# Patient Record
Sex: MALE | Race: WHITE | Employment: OTHER | URBAN - METROPOLITAN AREA
[De-identification: names, ages, dates, MRNs, and addresses within clinical notes are randomized per-mention and may not be internally consistent; named-entity substitution may affect disease eponyms.]

---

## 2019-01-01 ENCOUNTER — APPOINTMENT (OUTPATIENT)
Dept: GENERAL RADIOLOGY | Age: 84
DRG: 871 | End: 2019-01-01
Attending: INTERNAL MEDICINE
Payer: OTHER GOVERNMENT

## 2019-01-01 ENCOUNTER — APPOINTMENT (OUTPATIENT)
Dept: GENERAL RADIOLOGY | Age: 84
DRG: 871 | End: 2019-01-01
Attending: EMERGENCY MEDICINE
Payer: OTHER GOVERNMENT

## 2019-01-01 ENCOUNTER — APPOINTMENT (OUTPATIENT)
Dept: NON INVASIVE DIAGNOSTICS | Age: 84
DRG: 871 | End: 2019-01-01
Attending: INTERNAL MEDICINE
Payer: OTHER GOVERNMENT

## 2019-01-01 ENCOUNTER — HOSPITAL ENCOUNTER (INPATIENT)
Age: 84
LOS: 2 days | DRG: 871 | End: 2019-10-06
Attending: EMERGENCY MEDICINE | Admitting: INTERNAL MEDICINE
Payer: OTHER GOVERNMENT

## 2019-01-01 ENCOUNTER — APPOINTMENT (OUTPATIENT)
Dept: GENERAL RADIOLOGY | Age: 84
DRG: 871 | End: 2019-01-01
Attending: ANESTHESIOLOGY
Payer: OTHER GOVERNMENT

## 2019-01-01 VITALS
HEIGHT: 67 IN | SYSTOLIC BLOOD PRESSURE: 101 MMHG | BODY MASS INDEX: 24.84 KG/M2 | HEART RATE: 8 BPM | OXYGEN SATURATION: 94 % | WEIGHT: 158.29 LBS | DIASTOLIC BLOOD PRESSURE: 14 MMHG | TEMPERATURE: 98.5 F

## 2019-01-01 DIAGNOSIS — J96.21 ACUTE ON CHRONIC RESPIRATORY FAILURE WITH HYPOXIA AND HYPERCAPNIA (HCC): Primary | ICD-10-CM

## 2019-01-01 DIAGNOSIS — R05.9 COUGH: ICD-10-CM

## 2019-01-01 DIAGNOSIS — J96.22 ACUTE ON CHRONIC RESPIRATORY FAILURE WITH HYPOXIA AND HYPERCAPNIA (HCC): Primary | ICD-10-CM

## 2019-01-01 LAB
ALBUMIN SERPL-MCNC: 3.2 G/DL (ref 3.5–5)
ALBUMIN SERPL-MCNC: 3.4 G/DL (ref 3.5–5)
ALBUMIN/GLOB SERPL: 0.7 {RATIO} (ref 1.1–2.2)
ALBUMIN/GLOB SERPL: 0.9 {RATIO} (ref 1.1–2.2)
ALP SERPL-CCNC: 111 U/L (ref 45–117)
ALP SERPL-CCNC: 112 U/L (ref 45–117)
ALT SERPL-CCNC: 19 U/L (ref 12–78)
ALT SERPL-CCNC: 353 U/L (ref 12–78)
ANION GAP SERPL CALC-SCNC: 10 MMOL/L (ref 5–15)
ANION GAP SERPL CALC-SCNC: 10 MMOL/L (ref 5–15)
ANION GAP SERPL CALC-SCNC: 7 MMOL/L (ref 5–15)
ARTERIAL PATENCY WRIST A: YES
AST SERPL-CCNC: 24 U/L (ref 15–37)
AST SERPL-CCNC: 544 U/L (ref 15–37)
ATRIAL RATE: 123 BPM
ATRIAL RATE: 81 BPM
BASE DEFICIT BLD-SCNC: 1 MMOL/L
BASE DEFICIT BLD-SCNC: 2 MMOL/L
BASE DEFICIT BLD-SCNC: 3 MMOL/L
BASOPHILS # BLD: 0 K/UL (ref 0–0.1)
BASOPHILS # BLD: 0 K/UL (ref 0–0.1)
BASOPHILS NFR BLD: 0 % (ref 0–1)
BASOPHILS NFR BLD: 0 % (ref 0–1)
BDY SITE: ABNORMAL
BILIRUB SERPL-MCNC: 0.4 MG/DL (ref 0.2–1)
BILIRUB SERPL-MCNC: 1.1 MG/DL (ref 0.2–1)
BNP SERPL-MCNC: ABNORMAL PG/ML
BUN SERPL-MCNC: 26 MG/DL (ref 6–20)
BUN SERPL-MCNC: 28 MG/DL (ref 6–20)
BUN SERPL-MCNC: 32 MG/DL (ref 6–20)
BUN/CREAT SERPL: 7 (ref 12–20)
BUN/CREAT SERPL: 7 (ref 12–20)
BUN/CREAT SERPL: 9 (ref 12–20)
CALCIUM SERPL-MCNC: 8.3 MG/DL (ref 8.5–10.1)
CALCIUM SERPL-MCNC: 8.5 MG/DL (ref 8.5–10.1)
CALCIUM SERPL-MCNC: 8.8 MG/DL (ref 8.5–10.1)
CALCULATED P AXIS, ECG09: -6 DEGREES
CALCULATED P AXIS, ECG09: 54 DEGREES
CALCULATED R AXIS, ECG10: -52 DEGREES
CALCULATED R AXIS, ECG10: -67 DEGREES
CALCULATED T AXIS, ECG11: -113 DEGREES
CALCULATED T AXIS, ECG11: 83 DEGREES
CHLORIDE SERPL-SCNC: 102 MMOL/L (ref 97–108)
CHLORIDE SERPL-SCNC: 103 MMOL/L (ref 97–108)
CHLORIDE SERPL-SCNC: 96 MMOL/L (ref 97–108)
CK MB CFR SERPL CALC: 10 % (ref 0–2.5)
CK MB CFR SERPL CALC: 13.7 % (ref 0–2.5)
CK MB SERPL-MCNC: 19.4 NG/ML (ref 5–25)
CK MB SERPL-MCNC: 22.7 NG/ML (ref 5–25)
CK SERPL-CCNC: 142 U/L (ref 39–308)
CK SERPL-CCNC: 227 U/L (ref 39–308)
CO2 SERPL-SCNC: 24 MMOL/L (ref 21–32)
CO2 SERPL-SCNC: 26 MMOL/L (ref 21–32)
CO2 SERPL-SCNC: 26 MMOL/L (ref 21–32)
CREAT SERPL-MCNC: 3.6 MG/DL (ref 0.7–1.3)
CREAT SERPL-MCNC: 3.96 MG/DL (ref 0.7–1.3)
CREAT SERPL-MCNC: 4.07 MG/DL (ref 0.7–1.3)
DIAGNOSIS, 93000: NORMAL
DIAGNOSIS, 93000: NORMAL
DIFFERENTIAL METHOD BLD: ABNORMAL
DIFFERENTIAL METHOD BLD: ABNORMAL
ECHO AO ROOT DIAM: 3.35 CM
ECHO AV AREA PEAK VELOCITY: 1.4 CM2
ECHO AV AREA VTI: 1.3 CM2
ECHO AV AREA/BSA PEAK VELOCITY: 0.7 CM2/M2
ECHO AV AREA/BSA VTI: 0.7 CM2/M2
ECHO AV MEAN GRADIENT: 13.7 MMHG
ECHO AV PEAK GRADIENT: 18.1 MMHG
ECHO AV PEAK VELOCITY: 212.97 CM/S
ECHO AV VTI: 50.09 CM
ECHO LA MAJOR AXIS: 4.2 CM
ECHO LA TO AORTIC ROOT RATIO: 1.25
ECHO LV E' LATERAL VELOCITY: 7.93 CM/S
ECHO LV E' SEPTAL VELOCITY: 4.01 CM/S
ECHO LV INTERNAL DIMENSION DIASTOLIC: 5.01 CM (ref 4.2–5.9)
ECHO LV INTERNAL DIMENSION SYSTOLIC: 4.32 CM
ECHO LV IVSD: 1.09 CM (ref 0.6–1)
ECHO LV IVSS: 1.39 CM
ECHO LV MASS 2D: 240.5 G (ref 88–224)
ECHO LV MASS INDEX 2D: 126.8 G/M2 (ref 49–115)
ECHO LV POSTERIOR WALL DIASTOLIC: 1.08 CM (ref 0.6–1)
ECHO LV POSTERIOR WALL SYSTOLIC: 0.9 CM
ECHO LVOT CARDIAC OUTPUT: 5.6 L/MIN
ECHO LVOT DIAM: 2.08 CM
ECHO LVOT PEAK GRADIENT: 3 MMHG
ECHO LVOT PEAK VELOCITY: 87.07 CM/S
ECHO LVOT SV: 64.9 ML
ECHO LVOT VTI: 19.04 CM
ECHO MV AREA PHT: 5.6 CM2
ECHO MV E DECELERATION TIME (DT): 120.7 MS
ECHO MV E VELOCITY: 100.76 CM/S
ECHO MV E/E' LATERAL: 12.71
ECHO MV E/E' RATIO (AVERAGED): 18.92
ECHO MV E/E' SEPTAL: 25.13
ECHO MV PRESSURE HALF TIME (PHT): 39.2 MS
ECHO PV MAX VELOCITY: 94.31 CM/S
ECHO PV PEAK GRADIENT: 3.6 MMHG
EOSINOPHIL # BLD: 0 K/UL (ref 0–0.4)
EOSINOPHIL # BLD: 0 K/UL (ref 0–0.4)
EOSINOPHIL NFR BLD: 0 % (ref 0–7)
EOSINOPHIL NFR BLD: 0 % (ref 0–7)
ERYTHROCYTE [DISTWIDTH] IN BLOOD BY AUTOMATED COUNT: 16.4 % (ref 11.5–14.5)
ERYTHROCYTE [DISTWIDTH] IN BLOOD BY AUTOMATED COUNT: 16.5 % (ref 11.5–14.5)
EST. AVERAGE GLUCOSE BLD GHB EST-MCNC: 105 MG/DL
GAS FLOW.O2 O2 DELIVERY SYS: ABNORMAL L/MIN
GAS FLOW.O2 SETTING OXYMISER: 16 BPM
GLOBULIN SER CALC-MCNC: 3.8 G/DL (ref 2–4)
GLOBULIN SER CALC-MCNC: 4.3 G/DL (ref 2–4)
GLUCOSE BLD STRIP.AUTO-MCNC: 117 MG/DL (ref 65–100)
GLUCOSE BLD STRIP.AUTO-MCNC: 129 MG/DL (ref 65–100)
GLUCOSE BLD STRIP.AUTO-MCNC: 130 MG/DL (ref 65–100)
GLUCOSE BLD STRIP.AUTO-MCNC: 156 MG/DL (ref 65–100)
GLUCOSE BLD STRIP.AUTO-MCNC: 158 MG/DL (ref 65–100)
GLUCOSE BLD STRIP.AUTO-MCNC: 162 MG/DL (ref 65–100)
GLUCOSE BLD STRIP.AUTO-MCNC: 198 MG/DL (ref 65–100)
GLUCOSE SERPL-MCNC: 146 MG/DL (ref 65–100)
GLUCOSE SERPL-MCNC: 308 MG/DL (ref 65–100)
GLUCOSE SERPL-MCNC: 538 MG/DL (ref 65–100)
HBA1C MFR BLD: 5.3 % (ref 4.2–6.3)
HCO3 BLD-SCNC: 21.7 MMOL/L (ref 22–26)
HCO3 BLD-SCNC: 25.4 MMOL/L (ref 22–26)
HCO3 BLD-SCNC: 26 MMOL/L (ref 22–26)
HCT VFR BLD AUTO: 34.2 % (ref 36.6–50.3)
HCT VFR BLD AUTO: 38.8 % (ref 36.6–50.3)
HGB BLD-MCNC: 10.9 G/DL (ref 12.1–17)
HGB BLD-MCNC: 12.1 G/DL (ref 12.1–17)
IMM GRANULOCYTES # BLD AUTO: 0 K/UL (ref 0–0.04)
IMM GRANULOCYTES # BLD AUTO: 0.2 K/UL (ref 0–0.04)
IMM GRANULOCYTES NFR BLD AUTO: 0 % (ref 0–0.5)
IMM GRANULOCYTES NFR BLD AUTO: 1 % (ref 0–0.5)
LACTATE BLD-SCNC: 3.78 MMOL/L (ref 0.4–2)
LACTATE SERPL-SCNC: 10.8 MMOL/L (ref 0.4–2)
LACTATE SERPL-SCNC: 2.4 MMOL/L (ref 0.4–2)
LACTATE SERPL-SCNC: 3.4 MMOL/L (ref 0.4–2)
LACTATE SERPL-SCNC: 4.7 MMOL/L (ref 0.4–2)
LACTATE SERPL-SCNC: 6.2 MMOL/L (ref 0.4–2)
LACTATE SERPL-SCNC: 7.7 MMOL/L (ref 0.4–2)
LYMPHOCYTES # BLD: 0.7 K/UL (ref 0.8–3.5)
LYMPHOCYTES # BLD: 5.4 K/UL (ref 0.8–3.5)
LYMPHOCYTES NFR BLD: 27 % (ref 12–49)
LYMPHOCYTES NFR BLD: 4 % (ref 12–49)
MCH RBC QN AUTO: 30.9 PG (ref 26–34)
MCH RBC QN AUTO: 31 PG (ref 26–34)
MCHC RBC AUTO-ENTMCNC: 31.2 G/DL (ref 30–36.5)
MCHC RBC AUTO-ENTMCNC: 31.9 G/DL (ref 30–36.5)
MCV RBC AUTO: 97.2 FL (ref 80–99)
MCV RBC AUTO: 99.2 FL (ref 80–99)
MONOCYTES # BLD: 1.4 K/UL (ref 0–1)
MONOCYTES # BLD: 1.5 K/UL (ref 0–1)
MONOCYTES NFR BLD: 7 % (ref 5–13)
MONOCYTES NFR BLD: 8 % (ref 5–13)
NEUTS SEG # BLD: 13.2 K/UL (ref 1.8–8)
NEUTS SEG # BLD: 16.2 K/UL (ref 1.8–8)
NEUTS SEG NFR BLD: 66 % (ref 32–75)
NEUTS SEG NFR BLD: 87 % (ref 32–75)
NRBC # BLD: 0.02 K/UL (ref 0–0.01)
NRBC # BLD: 0.07 K/UL (ref 0–0.01)
NRBC BLD-RTO: 0.1 PER 100 WBC
NRBC BLD-RTO: 0.4 PER 100 WBC
O2/TOTAL GAS SETTING VFR VENT: 30 %
O2/TOTAL GAS SETTING VFR VENT: 50 %
O2/TOTAL GAS SETTING VFR VENT: 50 %
P-R INTERVAL, ECG05: 168 MS
P-R INTERVAL, ECG05: 232 MS
PCO2 BLD: 33.6 MMHG (ref 35–45)
PCO2 BLD: 48.3 MMHG (ref 35–45)
PCO2 BLD: 68.5 MMHG (ref 35–45)
PEEP RESPIRATORY: 6 CMH2O
PH BLD: 7.19 [PH] (ref 7.35–7.45)
PH BLD: 7.33 [PH] (ref 7.35–7.45)
PH BLD: 7.42 [PH] (ref 7.35–7.45)
PIP ISTAT,IPIP: 12
PIP ISTAT,IPIP: 18
PLATELET # BLD AUTO: 145 K/UL (ref 150–400)
PLATELET # BLD AUTO: 216 K/UL (ref 150–400)
PMV BLD AUTO: 9.9 FL (ref 8.9–12.9)
PMV BLD AUTO: 9.9 FL (ref 8.9–12.9)
PO2 BLD: 100 MMHG (ref 80–100)
PO2 BLD: 185 MMHG (ref 80–100)
PO2 BLD: 194 MMHG (ref 80–100)
POTASSIUM SERPL-SCNC: 4.2 MMOL/L (ref 3.5–5.1)
POTASSIUM SERPL-SCNC: 4.6 MMOL/L (ref 3.5–5.1)
POTASSIUM SERPL-SCNC: 5 MMOL/L (ref 3.5–5.1)
PROCALCITONIN SERPL-MCNC: 1.3 NG/ML
PROT SERPL-MCNC: 7.2 G/DL (ref 6.4–8.2)
PROT SERPL-MCNC: 7.5 G/DL (ref 6.4–8.2)
Q-T INTERVAL, ECG07: 338 MS
Q-T INTERVAL, ECG07: 448 MS
QRS DURATION, ECG06: 132 MS
QRS DURATION, ECG06: 142 MS
QTC CALCULATION (BEZET), ECG08: 483 MS
QTC CALCULATION (BEZET), ECG08: 520 MS
RBC # BLD AUTO: 3.52 M/UL (ref 4.1–5.7)
RBC # BLD AUTO: 3.91 M/UL (ref 4.1–5.7)
RBC MORPH BLD: ABNORMAL
SAO2 % BLD: 100 % (ref 92–97)
SAO2 % BLD: 98 % (ref 92–97)
SAO2 % BLD: 99 % (ref 92–97)
SERVICE CMNT-IMP: ABNORMAL
SODIUM SERPL-SCNC: 129 MMOL/L (ref 136–145)
SODIUM SERPL-SCNC: 137 MMOL/L (ref 136–145)
SODIUM SERPL-SCNC: 138 MMOL/L (ref 136–145)
SPECIMEN TYPE: ABNORMAL
TOTAL RESP. RATE, ITRR: 24
TOTAL RESP. RATE, ITRR: 29
TOTAL RESP. RATE, ITRR: 34
TROPONIN I SERPL-MCNC: 0.17 NG/ML
TROPONIN I SERPL-MCNC: 2.38 NG/ML
TROPONIN I SERPL-MCNC: 4.39 NG/ML
TROPONIN I SERPL-MCNC: 4.45 NG/ML
VENTILATION MODE VENT: ABNORMAL
VENTRICULAR RATE, ECG03: 123 BPM
VENTRICULAR RATE, ECG03: 81 BPM
VT SETTING VENT: 450 ML
WBC # BLD AUTO: 18.5 K/UL (ref 4.1–11.1)
WBC # BLD AUTO: 20 K/UL (ref 4.1–11.1)

## 2019-01-01 PROCEDURE — 74011250636 HC RX REV CODE- 250/636: Performed by: EMERGENCY MEDICINE

## 2019-01-01 PROCEDURE — 96368 THER/DIAG CONCURRENT INF: CPT

## 2019-01-01 PROCEDURE — 74011000250 HC RX REV CODE- 250: Performed by: INTERNAL MEDICINE

## 2019-01-01 PROCEDURE — 31500 INSERT EMERGENCY AIRWAY: CPT

## 2019-01-01 PROCEDURE — 94003 VENT MGMT INPAT SUBQ DAY: CPT

## 2019-01-01 PROCEDURE — 36600 WITHDRAWAL OF ARTERIAL BLOOD: CPT

## 2019-01-01 PROCEDURE — 74011250637 HC RX REV CODE- 250/637: Performed by: INTERNAL MEDICINE

## 2019-01-01 PROCEDURE — 82550 ASSAY OF CK (CPK): CPT

## 2019-01-01 PROCEDURE — 96367 TX/PROPH/DG ADDL SEQ IV INF: CPT

## 2019-01-01 PROCEDURE — 80053 COMPREHEN METABOLIC PANEL: CPT

## 2019-01-01 PROCEDURE — 96365 THER/PROPH/DIAG IV INF INIT: CPT

## 2019-01-01 PROCEDURE — 74011250636 HC RX REV CODE- 250/636: Performed by: INTERNAL MEDICINE

## 2019-01-01 PROCEDURE — 77030008683 HC TU ET CUF COVD -A

## 2019-01-01 PROCEDURE — 82803 BLOOD GASES ANY COMBINATION: CPT

## 2019-01-01 PROCEDURE — 82962 GLUCOSE BLOOD TEST: CPT

## 2019-01-01 PROCEDURE — 94640 AIRWAY INHALATION TREATMENT: CPT

## 2019-01-01 PROCEDURE — 5A1945Z RESPIRATORY VENTILATION, 24-96 CONSECUTIVE HOURS: ICD-10-PCS | Performed by: EMERGENCY MEDICINE

## 2019-01-01 PROCEDURE — 83880 ASSAY OF NATRIURETIC PEPTIDE: CPT

## 2019-01-01 PROCEDURE — 74011250636 HC RX REV CODE- 250/636: Performed by: ANESTHESIOLOGY

## 2019-01-01 PROCEDURE — 84145 PROCALCITONIN (PCT): CPT

## 2019-01-01 PROCEDURE — 71045 X-RAY EXAM CHEST 1 VIEW: CPT

## 2019-01-01 PROCEDURE — 5A09357 ASSISTANCE WITH RESPIRATORY VENTILATION, LESS THAN 24 CONSECUTIVE HOURS, CONTINUOUS POSITIVE AIRWAY PRESSURE: ICD-10-PCS | Performed by: ANESTHESIOLOGY

## 2019-01-01 PROCEDURE — 65610000006 HC RM INTENSIVE CARE

## 2019-01-01 PROCEDURE — 74011000258 HC RX REV CODE- 258: Performed by: EMERGENCY MEDICINE

## 2019-01-01 PROCEDURE — 83605 ASSAY OF LACTIC ACID: CPT

## 2019-01-01 PROCEDURE — 94002 VENT MGMT INPAT INIT DAY: CPT

## 2019-01-01 PROCEDURE — 77030012879 HC MSK CPAP FLL FAC PHIL -B

## 2019-01-01 PROCEDURE — 5A1D70Z PERFORMANCE OF URINARY FILTRATION, INTERMITTENT, LESS THAN 6 HOURS PER DAY: ICD-10-PCS | Performed by: INTERNAL MEDICINE

## 2019-01-01 PROCEDURE — 90935 HEMODIALYSIS ONE EVALUATION: CPT

## 2019-01-01 PROCEDURE — 05HN33Z INSERTION OF INFUSION DEVICE INTO LEFT INTERNAL JUGULAR VEIN, PERCUTANEOUS APPROACH: ICD-10-PCS | Performed by: ANESTHESIOLOGY

## 2019-01-01 PROCEDURE — P9047 ALBUMIN (HUMAN), 25%, 50ML: HCPCS | Performed by: INTERNAL MEDICINE

## 2019-01-01 PROCEDURE — 36415 COLL VENOUS BLD VENIPUNCTURE: CPT

## 2019-01-01 PROCEDURE — 74011000250 HC RX REV CODE- 250: Performed by: EMERGENCY MEDICINE

## 2019-01-01 PROCEDURE — 96366 THER/PROPH/DIAG IV INF ADDON: CPT

## 2019-01-01 PROCEDURE — 93005 ELECTROCARDIOGRAM TRACING: CPT

## 2019-01-01 PROCEDURE — 85025 COMPLETE CBC W/AUTO DIFF WBC: CPT

## 2019-01-01 PROCEDURE — 77030013140 HC MSK NEB VYRM -A

## 2019-01-01 PROCEDURE — 77030037878 HC DRSG MEPILEX >48IN BORD MOLN -B

## 2019-01-01 PROCEDURE — 83036 HEMOGLOBIN GLYCOSYLATED A1C: CPT

## 2019-01-01 PROCEDURE — 96375 TX/PRO/DX INJ NEW DRUG ADDON: CPT

## 2019-01-01 PROCEDURE — 77010033678 HC OXYGEN DAILY

## 2019-01-01 PROCEDURE — 77030040361 HC SLV COMPR DVT MDII -B

## 2019-01-01 PROCEDURE — 99285 EMERGENCY DEPT VISIT HI MDM: CPT

## 2019-01-01 PROCEDURE — 0BH17EZ INSERTION OF ENDOTRACHEAL AIRWAY INTO TRACHEA, VIA NATURAL OR ARTIFICIAL OPENING: ICD-10-PCS | Performed by: ANESTHESIOLOGY

## 2019-01-01 PROCEDURE — 93306 TTE W/DOPPLER COMPLETE: CPT

## 2019-01-01 PROCEDURE — B544ZZA ULTRASONOGRAPHY OF LEFT JUGULAR VEINS, GUIDANCE: ICD-10-PCS | Performed by: ANESTHESIOLOGY

## 2019-01-01 PROCEDURE — 94660 CPAP INITIATION&MGMT: CPT

## 2019-01-01 PROCEDURE — 74011250636 HC RX REV CODE- 250/636

## 2019-01-01 PROCEDURE — 84484 ASSAY OF TROPONIN QUANT: CPT

## 2019-01-01 PROCEDURE — 74011250636 HC RX REV CODE- 250/636: Performed by: FAMILY MEDICINE

## 2019-01-01 RX ORDER — MAGNESIUM SULFATE HEPTAHYDRATE 40 MG/ML
2 INJECTION, SOLUTION INTRAVENOUS ONCE
Status: COMPLETED | OUTPATIENT
Start: 2019-01-01 | End: 2019-01-01

## 2019-01-01 RX ORDER — PROPOFOL 10 MG/ML
150 INJECTION, EMULSION INTRAVENOUS
Status: COMPLETED | OUTPATIENT
Start: 2019-01-01 | End: 2019-01-01

## 2019-01-01 RX ORDER — ALBUTEROL SULFATE 0.83 MG/ML
2.5 SOLUTION RESPIRATORY (INHALATION)
Status: DISCONTINUED | OUTPATIENT
Start: 2019-01-01 | End: 2019-01-01 | Stop reason: HOSPADM

## 2019-01-01 RX ORDER — LEVOFLOXACIN 5 MG/ML
750 INJECTION, SOLUTION INTRAVENOUS EVERY 24 HOURS
Status: DISCONTINUED | OUTPATIENT
Start: 2019-01-01 | End: 2019-01-01 | Stop reason: DRUGHIGH

## 2019-01-01 RX ORDER — BUDESONIDE 0.5 MG/2ML
500 INHALANT ORAL
Status: DISCONTINUED | OUTPATIENT
Start: 2019-01-01 | End: 2019-01-01 | Stop reason: HOSPADM

## 2019-01-01 RX ORDER — IPRATROPIUM BROMIDE AND ALBUTEROL SULFATE 2.5; .5 MG/3ML; MG/3ML
3 SOLUTION RESPIRATORY (INHALATION)
Status: DISCONTINUED | OUTPATIENT
Start: 2019-01-01 | End: 2019-01-01 | Stop reason: HOSPADM

## 2019-01-01 RX ORDER — BISACODYL 5 MG
5 TABLET, DELAYED RELEASE (ENTERIC COATED) ORAL DAILY PRN
Status: DISCONTINUED | OUTPATIENT
Start: 2019-01-01 | End: 2019-01-01 | Stop reason: HOSPADM

## 2019-01-01 RX ORDER — MORPHINE SULFATE 2 MG/ML
2 INJECTION, SOLUTION INTRAMUSCULAR; INTRAVENOUS
Status: DISCONTINUED | OUTPATIENT
Start: 2019-01-01 | End: 2019-01-01 | Stop reason: HOSPADM

## 2019-01-01 RX ORDER — SUCCINYLCHOLINE CHLORIDE 20 MG/ML INJECTION SOLUTION
SOLUTION
Status: DISCONTINUED
Start: 2019-01-01 | End: 2019-01-01 | Stop reason: WASHOUT

## 2019-01-01 RX ORDER — PHENYLEPHRINE HCL IN 0.9% NACL 30MG/250ML
10-300 PLASTIC BAG, INJECTION (ML) INTRAVENOUS
Status: DISCONTINUED | OUTPATIENT
Start: 2019-01-01 | End: 2019-01-01

## 2019-01-01 RX ORDER — PHENYLEPHRINE HCL IN 0.9% NACL 30MG/250ML
10-300 PLASTIC BAG, INJECTION (ML) INTRAVENOUS
Status: DISCONTINUED | OUTPATIENT
Start: 2019-01-01 | End: 2019-01-01 | Stop reason: HOSPADM

## 2019-01-01 RX ORDER — HEPARIN SODIUM 5000 [USP'U]/ML
5000 INJECTION, SOLUTION INTRAVENOUS; SUBCUTANEOUS EVERY 12 HOURS
Status: DISCONTINUED | OUTPATIENT
Start: 2019-01-01 | End: 2019-01-01 | Stop reason: DRUGHIGH

## 2019-01-01 RX ORDER — LEVOFLOXACIN 5 MG/ML
500 INJECTION, SOLUTION INTRAVENOUS
Status: DISCONTINUED | OUTPATIENT
Start: 2019-01-01 | End: 2019-01-01 | Stop reason: HOSPADM

## 2019-01-01 RX ORDER — ONDANSETRON 2 MG/ML
4 INJECTION INTRAMUSCULAR; INTRAVENOUS
Status: DISCONTINUED | OUTPATIENT
Start: 2019-01-01 | End: 2019-01-01 | Stop reason: HOSPADM

## 2019-01-01 RX ORDER — ALBUTEROL SULFATE 0.83 MG/ML
2.5 SOLUTION RESPIRATORY (INHALATION)
COMMUNITY

## 2019-01-01 RX ORDER — PROPOFOL 10 MG/ML
INJECTION, EMULSION INTRAVENOUS
Status: COMPLETED
Start: 2019-01-01 | End: 2019-01-01

## 2019-01-01 RX ORDER — SODIUM CHLORIDE 0.9 % (FLUSH) 0.9 %
5-40 SYRINGE (ML) INJECTION AS NEEDED
Status: DISCONTINUED | OUTPATIENT
Start: 2019-01-01 | End: 2019-01-01 | Stop reason: HOSPADM

## 2019-01-01 RX ORDER — CHLORHEXIDINE GLUCONATE 0.12 MG/ML
15 RINSE ORAL EVERY 12 HOURS
Status: DISCONTINUED | OUTPATIENT
Start: 2019-01-01 | End: 2019-01-01 | Stop reason: HOSPADM

## 2019-01-01 RX ORDER — LORAZEPAM 2 MG/ML
1 INJECTION INTRAMUSCULAR
Status: DISCONTINUED | OUTPATIENT
Start: 2019-01-01 | End: 2019-01-01 | Stop reason: HOSPADM

## 2019-01-01 RX ORDER — LEVOFLOXACIN 5 MG/ML
750 INJECTION, SOLUTION INTRAVENOUS
Status: COMPLETED | OUTPATIENT
Start: 2019-01-01 | End: 2019-01-01

## 2019-01-01 RX ORDER — IPRATROPIUM BROMIDE 0.5 MG/2.5ML
0.5 SOLUTION RESPIRATORY (INHALATION) ONCE
Status: COMPLETED | OUTPATIENT
Start: 2019-01-01 | End: 2019-01-01

## 2019-01-01 RX ORDER — SODIUM CHLORIDE 0.9 % (FLUSH) 0.9 %
5-40 SYRINGE (ML) INJECTION EVERY 8 HOURS
Status: DISCONTINUED | OUTPATIENT
Start: 2019-01-01 | End: 2019-01-01 | Stop reason: HOSPADM

## 2019-01-01 RX ORDER — IPRATROPIUM BROMIDE AND ALBUTEROL SULFATE 2.5; .5 MG/3ML; MG/3ML
3 SOLUTION RESPIRATORY (INHALATION)
Status: COMPLETED | OUTPATIENT
Start: 2019-01-01 | End: 2019-01-01

## 2019-01-01 RX ORDER — CHLORHEXIDINE GLUCONATE 0.12 MG/ML
15 RINSE ORAL EVERY 12 HOURS
Status: DISCONTINUED | OUTPATIENT
Start: 2019-01-01 | End: 2019-01-01

## 2019-01-01 RX ORDER — SCOLOPAMINE TRANSDERMAL SYSTEM 1 MG/1
1 PATCH, EXTENDED RELEASE TRANSDERMAL
Status: DISCONTINUED | OUTPATIENT
Start: 2019-01-01 | End: 2019-01-01 | Stop reason: HOSPADM

## 2019-01-01 RX ORDER — ALBUMIN HUMAN 250 G/1000ML
12.5 SOLUTION INTRAVENOUS
Status: DISCONTINUED | OUTPATIENT
Start: 2019-01-01 | End: 2019-01-01 | Stop reason: HOSPADM

## 2019-01-01 RX ORDER — IPRATROPIUM BROMIDE 0.5 MG/2.5ML
0.5 SOLUTION RESPIRATORY (INHALATION)
COMMUNITY

## 2019-01-01 RX ORDER — ACETAMINOPHEN 325 MG/1
650 TABLET ORAL
Status: DISCONTINUED | OUTPATIENT
Start: 2019-01-01 | End: 2019-01-01 | Stop reason: HOSPADM

## 2019-01-01 RX ORDER — LORAZEPAM 2 MG/ML
INJECTION INTRAMUSCULAR
Status: COMPLETED
Start: 2019-01-01 | End: 2019-01-01

## 2019-01-01 RX ORDER — ALBUTEROL SULFATE 2.5 MG/.5ML
10 SOLUTION RESPIRATORY (INHALATION)
Status: COMPLETED | OUTPATIENT
Start: 2019-01-01 | End: 2019-01-01

## 2019-01-01 RX ORDER — FAMOTIDINE 10 MG/ML
20 INJECTION INTRAVENOUS EVERY 12 HOURS
Status: DISCONTINUED | OUTPATIENT
Start: 2019-01-01 | End: 2019-01-01 | Stop reason: DRUGHIGH

## 2019-01-01 RX ORDER — INSULIN LISPRO 100 [IU]/ML
INJECTION, SOLUTION INTRAVENOUS; SUBCUTANEOUS
Status: DISCONTINUED | OUTPATIENT
Start: 2019-01-01 | End: 2019-01-01 | Stop reason: HOSPADM

## 2019-01-01 RX ORDER — FINASTERIDE 5 MG/1
5 TABLET, FILM COATED ORAL DAILY
COMMUNITY

## 2019-01-01 RX ORDER — LORAZEPAM 2 MG/ML
0.5 INJECTION INTRAMUSCULAR ONCE
Status: COMPLETED | OUTPATIENT
Start: 2019-01-01 | End: 2019-01-01

## 2019-01-01 RX ORDER — PROPOFOL 10 MG/ML
0-50 VIAL (ML) INTRAVENOUS
Status: DISCONTINUED | OUTPATIENT
Start: 2019-01-01 | End: 2019-01-01 | Stop reason: HOSPADM

## 2019-01-01 RX ORDER — VANCOMYCIN/0.9 % SOD CHLORIDE 1.5G/250ML
1500 PLASTIC BAG, INJECTION (ML) INTRAVENOUS
Status: COMPLETED | OUTPATIENT
Start: 2019-01-01 | End: 2019-01-01

## 2019-01-01 RX ORDER — DEXTROSE 50 % IN WATER (D50W) INTRAVENOUS SYRINGE
12.5-25 AS NEEDED
Status: DISCONTINUED | OUTPATIENT
Start: 2019-01-01 | End: 2019-01-01 | Stop reason: HOSPADM

## 2019-01-01 RX ORDER — MAGNESIUM SULFATE 100 %
4 CRYSTALS MISCELLANEOUS AS NEEDED
Status: DISCONTINUED | OUTPATIENT
Start: 2019-01-01 | End: 2019-01-01 | Stop reason: HOSPADM

## 2019-01-01 RX ORDER — HEPARIN SODIUM 5000 [USP'U]/ML
5000 INJECTION, SOLUTION INTRAVENOUS; SUBCUTANEOUS EVERY 8 HOURS
Status: DISCONTINUED | OUTPATIENT
Start: 2019-01-01 | End: 2019-01-01 | Stop reason: HOSPADM

## 2019-01-01 RX ORDER — ASPIRIN 300 MG/1
300 SUPPOSITORY RECTAL DAILY
Status: DISCONTINUED | OUTPATIENT
Start: 2019-01-01 | End: 2019-01-01 | Stop reason: HOSPADM

## 2019-01-01 RX ORDER — ATORVASTATIN CALCIUM 80 MG/1
40 TABLET, FILM COATED ORAL DAILY
COMMUNITY

## 2019-01-01 RX ADMIN — Medication 1 AMPULE: at 22:34

## 2019-01-01 RX ADMIN — METHYLPREDNISOLONE SODIUM SUCCINATE 40 MG: 40 INJECTION, POWDER, FOR SOLUTION INTRAMUSCULAR; INTRAVENOUS at 21:56

## 2019-01-01 RX ADMIN — Medication 140 MCG/MIN: at 14:54

## 2019-01-01 RX ADMIN — PROPOFOL 10 MCG/KG/MIN: 10 INJECTION, EMULSION INTRAVENOUS at 01:46

## 2019-01-01 RX ADMIN — HEPARIN SODIUM 5000 UNITS: 5000 INJECTION INTRAVENOUS; SUBCUTANEOUS at 06:35

## 2019-01-01 RX ADMIN — Medication 100 MCG/MIN: at 08:14

## 2019-01-01 RX ADMIN — ALBUMIN (HUMAN) 12.5 G: 0.25 INJECTION, SOLUTION INTRAVENOUS at 14:16

## 2019-01-01 RX ADMIN — IPRATROPIUM BROMIDE AND ALBUTEROL SULFATE 3 ML: .5; 3 SOLUTION RESPIRATORY (INHALATION) at 00:56

## 2019-01-01 RX ADMIN — METHYLPREDNISOLONE SODIUM SUCCINATE 40 MG: 40 INJECTION, POWDER, FOR SOLUTION INTRAMUSCULAR; INTRAVENOUS at 14:18

## 2019-01-01 RX ADMIN — PIPERACILLIN AND TAZOBACTAM 3.38 G: 3; .375 INJECTION, POWDER, LYOPHILIZED, FOR SOLUTION INTRAVENOUS at 09:12

## 2019-01-01 RX ADMIN — PROPOFOL 15 MCG/KG/MIN: 10 INJECTION, EMULSION INTRAVENOUS at 04:30

## 2019-01-01 RX ADMIN — SODIUM CHLORIDE 250 ML: 900 INJECTION, SOLUTION INTRAVENOUS at 23:45

## 2019-01-01 RX ADMIN — PROPOFOL 15 MCG/KG/MIN: 10 INJECTION, EMULSION INTRAVENOUS at 02:30

## 2019-01-01 RX ADMIN — PROPOFOL 15 MCG/KG/MIN: 10 INJECTION, EMULSION INTRAVENOUS at 06:58

## 2019-01-01 RX ADMIN — Medication 180 MCG/MIN: at 01:31

## 2019-01-01 RX ADMIN — ALBUMIN (HUMAN) 12.5 G: 0.25 INJECTION, SOLUTION INTRAVENOUS at 16:20

## 2019-01-01 RX ADMIN — HEPARIN SODIUM 5000 UNITS: 5000 INJECTION INTRAVENOUS; SUBCUTANEOUS at 14:18

## 2019-01-01 RX ADMIN — IPRATROPIUM BROMIDE AND ALBUTEROL SULFATE 3 ML: .5; 3 SOLUTION RESPIRATORY (INHALATION) at 09:01

## 2019-01-01 RX ADMIN — PIPERACILLIN AND TAZOBACTAM 3.38 G: 3; .375 INJECTION, POWDER, LYOPHILIZED, FOR SOLUTION INTRAVENOUS at 20:39

## 2019-01-01 RX ADMIN — Medication 110 MCG/MIN: at 13:41

## 2019-01-01 RX ADMIN — ASPIRIN 300 MG: 300 SUPPOSITORY RECTAL at 17:00

## 2019-01-01 RX ADMIN — PROPOFOL 10 MCG/KG/MIN: 10 INJECTION, EMULSION INTRAVENOUS at 05:17

## 2019-01-01 RX ADMIN — IPRATROPIUM BROMIDE AND ALBUTEROL SULFATE 3 ML: .5; 3 SOLUTION RESPIRATORY (INHALATION) at 07:57

## 2019-01-01 RX ADMIN — CHLORHEXIDINE GLUCONATE 15 ML: 0.12 RINSE ORAL at 21:00

## 2019-01-01 RX ADMIN — PROPOFOL 150 MG: 10 INJECTION, EMULSION INTRAVENOUS at 20:20

## 2019-01-01 RX ADMIN — Medication 170 MCG/MIN: at 01:02

## 2019-01-01 RX ADMIN — Medication 150 MCG/MIN: at 00:27

## 2019-01-01 RX ADMIN — BUDESONIDE 500 MCG: 0.5 INHALANT RESPIRATORY (INHALATION) at 19:36

## 2019-01-01 RX ADMIN — Medication 10 ML: at 22:35

## 2019-01-01 RX ADMIN — IPRATROPIUM BROMIDE AND ALBUTEROL SULFATE 3 ML: .5; 3 SOLUTION RESPIRATORY (INHALATION) at 20:00

## 2019-01-01 RX ADMIN — MAGNESIUM SULFATE HEPTAHYDRATE 2 G: 40 INJECTION, SOLUTION INTRAVENOUS at 23:57

## 2019-01-01 RX ADMIN — METHYLPREDNISOLONE SODIUM SUCCINATE 125 MG: 125 INJECTION, POWDER, FOR SOLUTION INTRAMUSCULAR; INTRAVENOUS at 23:54

## 2019-01-01 RX ADMIN — PIPERACILLIN SODIUM,TAZOBACTAM SODIUM 3.38 G: 3; .375 INJECTION, POWDER, FOR SOLUTION INTRAVENOUS at 03:00

## 2019-01-01 RX ADMIN — IPRATROPIUM BROMIDE AND ALBUTEROL SULFATE 3 ML: .5; 3 SOLUTION RESPIRATORY (INHALATION) at 23:00

## 2019-01-01 RX ADMIN — METHYLPREDNISOLONE SODIUM SUCCINATE 40 MG: 40 INJECTION, POWDER, FOR SOLUTION INTRAMUSCULAR; INTRAVENOUS at 06:35

## 2019-01-01 RX ADMIN — IPRATROPIUM BROMIDE AND ALBUTEROL SULFATE 3 ML: .5; 3 SOLUTION RESPIRATORY (INHALATION) at 19:36

## 2019-01-01 RX ADMIN — VANCOMYCIN HYDROCHLORIDE 1500 MG: 10 INJECTION, POWDER, LYOPHILIZED, FOR SOLUTION INTRAVENOUS at 09:19

## 2019-01-01 RX ADMIN — Medication 10 MCG/MIN: at 19:07

## 2019-01-01 RX ADMIN — IPRATROPIUM BROMIDE AND ALBUTEROL SULFATE 3 ML: .5; 3 SOLUTION RESPIRATORY (INHALATION) at 03:03

## 2019-01-01 RX ADMIN — LORAZEPAM 0.5 MG: 2 INJECTION INTRAMUSCULAR; INTRAVENOUS at 16:00

## 2019-01-01 RX ADMIN — HEPARIN SODIUM 5000 UNITS: 5000 INJECTION INTRAVENOUS; SUBCUTANEOUS at 20:40

## 2019-01-01 RX ADMIN — Medication 10 ML: at 14:18

## 2019-01-01 RX ADMIN — IPRATROPIUM BROMIDE AND ALBUTEROL SULFATE 3 ML: .5; 3 SOLUTION RESPIRATORY (INHALATION) at 11:43

## 2019-01-01 RX ADMIN — PROPOFOL 25 MCG/KG/MIN: 10 INJECTION, EMULSION INTRAVENOUS at 21:56

## 2019-01-01 RX ADMIN — LEVOFLOXACIN 750 MG: 5 INJECTION, SOLUTION INTRAVENOUS at 03:44

## 2019-01-01 RX ADMIN — PIPERACILLIN AND TAZOBACTAM 3.38 G: 3; .375 INJECTION, POWDER, LYOPHILIZED, FOR SOLUTION INTRAVENOUS at 08:23

## 2019-01-01 RX ADMIN — IPRATROPIUM BROMIDE 0.5 MG: 0.5 SOLUTION RESPIRATORY (INHALATION) at 23:55

## 2019-01-01 RX ADMIN — Medication 10 ML: at 13:47

## 2019-01-01 RX ADMIN — Medication 160 MCG/MIN: at 00:45

## 2019-01-01 RX ADMIN — HEPARIN SODIUM 5000 UNITS: 5000 INJECTION INTRAVENOUS; SUBCUTANEOUS at 22:35

## 2019-01-01 RX ADMIN — IPRATROPIUM BROMIDE AND ALBUTEROL SULFATE 3 ML: .5; 3 SOLUTION RESPIRATORY (INHALATION) at 23:02

## 2019-01-01 RX ADMIN — PROPOFOL 35 MCG/KG/MIN: 10 INJECTION, EMULSION INTRAVENOUS at 12:45

## 2019-01-01 RX ADMIN — PROPOFOL 10 MCG/KG/MIN: 10 INJECTION, EMULSION INTRAVENOUS at 20:30

## 2019-01-01 RX ADMIN — Medication 90 MCG/MIN: at 02:17

## 2019-01-01 RX ADMIN — METHYLPREDNISOLONE SODIUM SUCCINATE 40 MG: 40 INJECTION, POWDER, FOR SOLUTION INTRAMUSCULAR; INTRAVENOUS at 05:22

## 2019-01-01 RX ADMIN — Medication 10 ML: at 06:35

## 2019-01-01 RX ADMIN — PROPOFOL 25 MCG/KG/MIN: 10 INJECTION, EMULSION INTRAVENOUS at 21:10

## 2019-01-01 RX ADMIN — METHYLPREDNISOLONE SODIUM SUCCINATE 40 MG: 40 INJECTION, POWDER, FOR SOLUTION INTRAMUSCULAR; INTRAVENOUS at 22:34

## 2019-01-01 RX ADMIN — LORAZEPAM 1 MG: 2 INJECTION INTRAMUSCULAR; INTRAVENOUS at 04:39

## 2019-01-01 RX ADMIN — ALBUMIN (HUMAN) 12.5 G: 0.25 INJECTION, SOLUTION INTRAVENOUS at 14:17

## 2019-01-01 RX ADMIN — PIPERACILLIN AND TAZOBACTAM 3.38 G: 3; .375 INJECTION, POWDER, LYOPHILIZED, FOR SOLUTION INTRAVENOUS at 22:34

## 2019-01-01 RX ADMIN — FAMOTIDINE 20 MG: 10 INJECTION, SOLUTION INTRAVENOUS at 05:22

## 2019-01-01 RX ADMIN — IPRATROPIUM BROMIDE AND ALBUTEROL SULFATE 3 ML: .5; 3 SOLUTION RESPIRATORY (INHALATION) at 11:51

## 2019-01-01 RX ADMIN — MORPHINE SULFATE 2 MG: 2 INJECTION, SOLUTION INTRAMUSCULAR; INTRAVENOUS at 04:47

## 2019-01-01 RX ADMIN — BUDESONIDE 500 MCG: 0.5 INHALANT RESPIRATORY (INHALATION) at 11:51

## 2019-01-01 RX ADMIN — Medication 40 ML: at 00:28

## 2019-01-01 RX ADMIN — Medication 1 AMPULE: at 20:43

## 2019-01-01 RX ADMIN — Medication 150 MCG/MIN: at 21:00

## 2019-01-01 RX ADMIN — Medication 150 MCG/MIN: at 17:20

## 2019-01-01 RX ADMIN — Medication 1 AMPULE: at 08:43

## 2019-01-01 RX ADMIN — HEPARIN SODIUM 5000 UNITS: 5000 INJECTION INTRAVENOUS; SUBCUTANEOUS at 05:22

## 2019-01-01 RX ADMIN — ALBUTEROL SULFATE 10 MG/HR: 2.5 SOLUTION RESPIRATORY (INHALATION) at 23:56

## 2019-01-01 RX ADMIN — LORAZEPAM 0.5 MG: 2 INJECTION INTRAMUSCULAR at 16:00

## 2019-01-01 RX ADMIN — LEVOFLOXACIN 500 MG: 5 INJECTION, SOLUTION INTRAVENOUS at 03:05

## 2019-01-01 RX ADMIN — METHYLPREDNISOLONE SODIUM SUCCINATE 40 MG: 40 INJECTION, POWDER, FOR SOLUTION INTRAMUSCULAR; INTRAVENOUS at 13:43

## 2019-01-01 RX ADMIN — Medication 240 MCG/MIN: at 03:03

## 2019-01-01 RX ADMIN — Medication 10 ML: at 05:22

## 2019-01-01 RX ADMIN — Medication 240 MCG/MIN: at 02:00

## 2019-01-01 RX ADMIN — FAMOTIDINE 20 MG: 10 INJECTION, SOLUTION INTRAVENOUS at 06:35

## 2019-01-01 RX ADMIN — IPRATROPIUM BROMIDE AND ALBUTEROL SULFATE 3 ML: .5; 3 SOLUTION RESPIRATORY (INHALATION) at 16:00

## 2019-10-03 NOTE — LETTER
NOTIFICATION RETURN TO WORK / SCHOOL 
 
10/4/2019 2:51 AM 
 
Mr. Winston Cervantes [address] [facility] [address] [facility] [address] To Whom It May Concern: 
 
Winston Cervantes is currently under the care of [facility] EMERGENCY DEPT. He will return to work/school on: 10/07/19 Winston Cervantes may return to work/school with the following restrictions: No restrictions. If there are questions or concerns please have the patient contact our office. Sincerely, Justina Johnson MD

## 2019-10-04 PROBLEM — J96.00 ARF (ACUTE RESPIRATORY FAILURE) (HCC): Status: ACTIVE | Noted: 2019-01-01

## 2019-10-04 NOTE — PROGRESS NOTES
Adjusted heparin to 5000 units sq every 8 hrs per dvt prophylaxis protocol. Normal weight patient. Crcl 11.3 ml/min

## 2019-10-04 NOTE — PROGRESS NOTES
ADULT PROTOCOL: JET AEROSOL  REASSESSMENT Patient  Nichole Calderon     80 y.o.   male     10/4/2019  5:09 PM 
 
Breath Sounds Pre Procedure: Right Breath Sounds: Diminished Left Breath Sounds: Diminished Breath Sounds Post Procedure: Right Breath Sounds: Diminished Left Breath Sounds: Diminished Breathing pattern: Pre procedure Breathing Pattern: Tachypneic Post procedure Breathing Pattern: Tachypneic Heart Rate: Pre procedure Pulse: 97 
         Post procedure Pulse: 101 Resp Rate: Pre procedure Respirations: 39 Post procedure Respirations: 37 Oxygen: O2 Device: BIPAP SpO2: Pre procedure SpO2: 100 % Post procedure SpO2: 99 % Nebulizer Therapy: Current medications Aerosolized Medications: DuoNeb Problem List:  
Patient Active Problem List  
Diagnosis Code  ARF (acute respiratory failure) ([facility]) J96.00 Respiratory Therapist: Priyank Cherry

## 2019-10-04 NOTE — PROCEDURES
[facility] Dialysis Team [place]  [phone] Vitals   Pre   Post   Assessment   Pre   Post    
Temp  Temp: 98.4 °F (36.9 °C) (10/04/19 1420)  97.4 Ax LOC  [place], Alert/oriented x4 No  change HR   Pulse (Heart Rate): 90 (10/04/19 1420) 93/44 Lungs   BIPAP, Labored, diminished  No change B/P   BP: 99/44 (10/04/19 1420) 91 Cardiac   Monitored, NSR  no change Resp   Resp Rate: (!) 35 (10/04/19 1420) 28 Skin   Warm/dry No change Pain level  Pain Intensity 1: 0 (10/04/19 0615) 0 Edema  generalized No change Orders: Duration:   Start:    1420 End:    1720 Total:   3hrs Dialyzer:   Dialyzer/Set Up Inspection: Mackenzie Peña (10/04/19 1420) K Bath:   Dialysate K (mEq/L): 3 (10/04/19 1420) Ca Bath:   Dialysate CA (mEq/L): 2.5 (10/04/19 1420) Na/Bicarb:   Dialysate NA (mEq/L): 138 (10/04/19 1420) Target Fluid Removal:   Goal/Amount of Fluid to Remove (mL): 3000 mL (10/04/19 1420) Access Type & Location:   RUE, AVF, (+) Bruit/thrill, cannulated with 15g needles x2, patent, pump at 450 Labs Obtained/Reviewed Critical Results Called   Date when labs were drawn- 
Hgb-   
HGB Date Value Ref Range Status 10/04/2019 12.1 12.1 - 17.0 g/dL Final  
 
K-   
Potassium Date Value Ref Range Status 10/04/2019 4.2 3.5 - 5.1 mmol/L Final  
 
Ca-  
Calcium Date Value Ref Range Status 10/04/2019 8.3 (L) 8.5 - 10.1 MG/DL Final  
 
Bun-  
BUN Date Value Ref Range Status 10/04/2019 28 (H) 6 - 20 MG/DL Final  
 
Creat-  
Creatinine Date Value Ref Range Status 10/04/2019 4.07 (H) 0.70 - 1.30 MG/DL Final  
 
  
Medications/ Blood Products Given Name   Dose   Route and Time Albumin 24g/100ml AVF @ 1420 Albumin 12.5g AVF @ 1620 Blood Volume Processed (BVP):    68 Net Fluid Removed:  1000 Comments Time Out Done: 1400 Primary Nurse Rpt Pre: Timmy Turpin RN 
Primary Nurse Rpt [address], RN 
Pt Education: hospital dialysis procedures Care Plan: continue treatment as ordered Tx Summary: 
1550 after attempting to speak with MD, patient became having severe labored breathing and BP dropped to 82/51. UF paused. 1720 Treatment complete. Due to low BP after giving 3 doses Albumin, net UF 1000ml. All possible blood returned with normal saline rinse back. Needles removed, no excessive bleeding, sites secured. Admiting Diagnosis: fluid overload/PNE Pt's previous clinic- [facility] [place] Consent signed - Obtained Kaylaita Consent - obtained Hepatitis Status- Ag (-) 10/2/19 Machine #- Machine Number: [id number] (10/04/19 1420) Telemetry status-monitored Pre-dialysis wt. - Pre-Dialysis Weight: 78 kg (171 lb 15.3 oz) (10/04/19 1420)

## 2019-10-04 NOTE — CDMP QUERY
Dr Mannie Spann: [place] Pt admitted with Pneumonia. Pt noted to have 3/4 SIRS/Sepsis criteria ie hypoxia, Leukocytosis, tachycardia, hypotension on 10/4, lactic acid 2.3  . If possible, please document in the progress notes and d/c summary if you are evaluating and / or treating any of the following: 
 
? Sepsis POA, suspected or probable causative organism (please specify) ? No Sepsis, No Sepsis, suspected or probable localized infection (please specify) ? Sepsis was ruled out (include corresponding diagnosis for patients clinical picture and treatment) ? Other, please specify ? Clinically unable to determine The medical record reflects the following: 
   Risk Factors: 79 yo M w/ Pneumonia, hypoxic/hypercapneic respiratory failure Clinical Indicators: WBC 20K, becoming hypotensive 110/45 MAP 63, 102/ 65, tachycardia HR 120s-90s, Pneumonia, lactic acid 2.4. Treatment: currently on BIPAP, IV abx IV Zosyn, IV Vanc, blood cx ordered 10/4, ricky. Thank you,  
Iliana Hanna, [facility], [address] [place] [address], [address] [place] [address]  
[phone]

## 2019-10-04 NOTE — PROGRESS NOTES
Pharmacy Medication Reconciliation The patient was interviewed regarding current PTA medication list, use and drug allergies; Patient's spouse was able to read the prescriptions over the phone for medication reconciliation. The patient was questioned regarding use of any other inhalers, topical products, over the counter medications, herbal medications, vitamin products or ophthalmic/nasal/otic medication use. Allergy Update: Niacin Recommendations/Findings: The following amendments were made to the patient's active medication list on file at [facility]:  
1) Additions: none 2) Deletions: none 3) Changes: none 
 
 
-Clarified PTA med list with spouse. PTA medication list was corrected to the following:  
 
Prior to Admission Medications Prescriptions Last Dose Informant Patient Reported? Taking? albuterol (PROVENTIL VENTOLIN) 2.5 mg /3 mL (0.083 %) nebu 10/3/2019 at Unknown time  Yes Yes Si.5 mg by Nebulization route every six (6) hours as needed (wheezing, S.o.b.). atorvastatin (LIPITOR) 80 mg tablet 10/3/2019 at Unknown time  Yes Yes Sig: Take 40 mg by mouth daily. budesonide (PULMICORT FLEXHALER) 90 mcg/actuation aepb inhaler 10/3/2019 at Unknown time  Yes Yes Sig: Take 1 Puff by inhalation daily as needed (emergent sob). finasteride (PROSCAR) 5 mg tablet 10/3/2019 at Unknown time  Yes Yes Sig: Take 5 mg by mouth daily. ipratropium (ATROVENT) 0.02 % soln 10/3/2019 at Unknown time  Yes Yes Si.5 mg by Nebulization route every six (6) hours as needed (wheezing, sob.). Facility-Administered Medications: None Thank you, 
Sophy Zheng, PHARMD

## 2019-10-04 NOTE — CONSULTS
[facility] Pulmonary, Critical Care, and Sleep Medicine Initial Patient Consult Name: Kyrie Jaquez MRN: [id number] : 1929 Hospital: [facility] Date: 10/4/2019 IMPRESSION:  
· Acute hypoxic/hypercapnic respiratory failure · COPD with acute exacerbation - regularly a patient at the [place] (on Symbicort and albuterol) · Acute pulmonary edema · Hyponatremia · ESRD · CAD · DM  
  
RECOMMENDATIONS:  
· BiPAP for now - wean as tolerated · I will start him on scheduled bronchodilators · Systemic corticosteroids · Empiric antibiotics · Needs volume off with hemodialysis · I will start insulin for his diabetes, will defer ongoing management to the hospitalist service · Check serial/follow up cardiac enzymes · DVT prophylaxis Subjective: This patient has been seen and evaluated at the request of Dr. Isela Gore for COPD and respiratory failure. Patient is a 80 y.o. male former smoker with COPD and ESRD, typically gets his care at the [place]. He noted increasing dyspnea for several days PTA, and last night it became severe enough that he felt he needed to come to the ER. He was found to be in hypercapnic respiratory failure, and he was started on BiPAP. Still feeling short of breath. Minimal sputum production, no fevers. Past Medical History:  
Diagnosis Date  Arthritis  CAD (coronary artery disease)   
 triple bipass  Cancer ([address] bladder  COPD (chronic obstructive pulmonary disease) (HCC) Past Surgical History:  
Procedure Laterality Date  CARDIAC SURG PROCEDURE UNLIST    
 triple bipass Prior to Admission medications Not on File Allergies Allergen Reactions  Niacin Anaphylaxis Social History Tobacco Use  Smoking status: Former Smoker  Smokeless tobacco: Never Used Substance Use Topics  Alcohol use: Yes Comment: occ History reviewed. No pertinent family history. Current Facility-Administered Medications Medication Dose Route Frequency  vancomycin (VANCOCIN) 1500 mg in  ml infusion  1,500 mg IntraVENous NOW  
 [START ON 10/6/2019] levoFLOXacin (LEVAQUIN) 500 mg in D5W IVPB  500 mg IntraVENous Q48H  
 [START ON 10/6/2019] vancomycin (VANCOCIN) 1,000 mg in 0.9% sodium chloride (MBP/ADV) 250 mL  1,000 mg IntraVENous Q48H  piperacillin-tazobactam (ZOSYN) 3.375 g in 0.9% sodium chloride (MBP/ADV) 100 mL  3.375 g IntraVENous Q12H  
 sodium chloride (NS) flush 5-40 mL  5-40 mL IntraVENous Q8H  
 heparin (porcine) injection 5,000 Units  5,000 Units SubCUTAneous Q8H  
 methylPREDNISolone (PF) (SOLU-MEDROL) injection 40 mg  40 mg IntraVENous Q8H  
 famotidine (PF) (PEPCID) 20 mg in sodium chloride 0.9% 10 mL injection  20 mg IntraVENous Q24H Review of Systems: A comprehensive review of systems was negative except for that written in the HPI. Objective:  
Vital Signs:   
Visit Vitals /45 Pulse 92 Temp 95 °F (35 °C) Resp 24 Ht 5' 7\" (1.702 m) Wt 78 kg (172 lb) SpO2 100% BMI 26.94 kg/m² O2 Device: BIPAP  
O2 Flow Rate (L/min): 8 l/min Temp (24hrs), Av.1 °F (36.2 °C), Min:95 °F (35 °C), Max:98.6 °F (37 °C) Intake/Output:  
Last shift:      No intake/output data recorded. Last 3 shifts: 10/02 1901 - 10/04 0700 In: 48 [I.V.:50] Out: - Intake/Output Summary (Last 24 hours) at 10/4/2019 09 Last data filed at 10/4/2019 2323 Gross per 24 hour Intake 50 ml Output  Net 50 ml Physical Exam:  
General:  Alert, cooperative, no distress on BiPAP Head:  Normocephalic, without obvious abnormality, atraumatic. Eyes:  Conjunctivae/corneas clear. Nose: Nares normal. Septum midline. Mucosa normal.    
Throat: Lips, mucosa, and tongue normal. Teeth and gums normal.  
Neck: Supple, symmetrical, trachea midline Back:   Symmetric, no curvature.  ROM normal.  
 Lungs: Bilateral end-expiratory wheeze Chest wall:  No tenderness or deformity. Heart:  Regular rate and rhythm Abdomen:   Soft, non-tender. Bowel sounds normal.    
Extremities: Extremities normal, atraumatic, no cyanosis or edema. Skin: Skin color, texture, turgor normal. No rashes or lesions Lymph nodes: Cervical, supraclavicular nodes normal.  
Neurologic: Grossly nonfocal  
 
Data review:  
 
Recent Results (from the past 24 hour(s)) EKG, 12 LEAD, INITIAL Collection Time: 10/03/19 11:52 PM  
Result Value Ref Range Ventricular Rate 123 BPM  
 Atrial Rate 123 BPM  
 P-R Interval 168 ms QRS Duration 142 ms  
 Q-T Interval 338 ms QTC Calculation (Bezet) 483 ms Calculated P Axis -6 degrees Calculated R Axis -52 degrees Calculated T Axis 83 degrees Diagnosis Sinus tachycardia Left axis deviation Left bundle branch block No previous ECGs available Confirmed by Kathryn Chin MD, Angela Phipps ([id number]) on 10/4/2019 8:14:13 AM 
  
CBC WITH AUTOMATED DIFF Collection Time: 10/04/19 12:09 AM  
Result Value Ref Range WBC 20.0 (H) 4.1 - 11.1 K/uL  
 RBC 3.91 (L) 4.10 - 5.70 M/uL  
 HGB 12.1 12.1 - 17.0 g/dL HCT 38.8 36.6 - 50.3 % MCV 99.2 (H) 80.0 - 99.0 FL  
 MCH 30.9 26.0 - 34.0 PG  
 MCHC 31.2 30.0 - 36.5 g/dL  
 RDW 16.5 (H) 11.5 - 14.5 % PLATELET 004 604 - 347 K/uL MPV 9.9 8.9 - 12.9 FL  
 NRBC 0.1 (H) 0  WBC ABSOLUTE NRBC 0.02 (H) 0.00 - 0.01 K/uL NEUTROPHILS 66 32 - 75 % LYMPHOCYTES 27 12 - 49 % MONOCYTES 7 5 - 13 % EOSINOPHILS 0 0 - 7 % BASOPHILS 0 0 - 1 % IMMATURE GRANULOCYTES 0 0.0 - 0.5 % ABS. NEUTROPHILS 13.2 (H) 1.8 - 8.0 K/UL  
 ABS. LYMPHOCYTES 5.4 (H) 0.8 - 3.5 K/UL  
 ABS. MONOCYTES 1.4 (H) 0.0 - 1.0 K/UL  
 ABS. EOSINOPHILS 0.0 0.0 - 0.4 K/UL  
 ABS. BASOPHILS 0.0 0.0 - 0.1 K/UL  
 ABS. IMM. GRANS. 0.0 0.00 - 0.04 K/UL  
 DF MANUAL    
 RBC COMMENTS NORMOCYTIC, NORMOCHROMIC METABOLIC PANEL, COMPREHENSIVE  
 Collection Time: 10/04/19 12:09 AM  
Result Value Ref Range Sodium 137 136 - 145 mmol/L Potassium 4.6 3.5 - 5.1 mmol/L Chloride 103 97 - 108 mmol/L  
 CO2 24 21 - 32 mmol/L Anion gap 10 5 - 15 mmol/L Glucose 308 (H) 65 - 100 mg/dL BUN 26 (H) 6 - 20 MG/DL Creatinine 3.96 (H) 0.70 - 1.30 MG/DL  
 BUN/Creatinine ratio 7 (L) 12 - 20 GFR est AA 17 (L) >60 ml/min/1.73m2 GFR est non-AA 14 (L) >60 ml/min/1.73m2 Calcium 8.5 8.5 - 10.1 MG/DL Bilirubin, total 0.4 0.2 - 1.0 MG/DL  
 ALT (SGPT) 19 12 - 78 U/L  
 AST (SGOT) 24 15 - 37 U/L Alk. phosphatase 111 45 - 117 U/L Protein, total 7.5 6.4 - 8.2 g/dL Albumin 3.2 (L) 3.5 - 5.0 g/dL Globulin 4.3 (H) 2.0 - 4.0 g/dL A-G Ratio 0.7 (L) 1.1 - 2.2 NT-PRO BNP Collection Time: 10/04/19 12:09 AM  
Result Value Ref Range NT pro-BNP 32,370 (H) <450 PG/ML  
TROPONIN I Collection Time: 10/04/19 12:09 AM  
Result Value Ref Range Troponin-I, Qt. 0.17 (H) <0.05 ng/mL POC LACTIC ACID Collection Time: 10/04/19 12:11 AM  
Result Value Ref Range Lactic Acid (POC) 3.78 (HH) 0.40 - 2.00 mmol/L  
POC G3 - PUL Collection Time: 10/04/19 12:33 AM  
Result Value Ref Range FIO2 (POC) 50 % pH (POC) 7.188 (LL) 7.35 - 7.45    
 pCO2 (POC) 68.5 (H) 35.0 - 45.0 MMHG  
 pO2 (POC) 185 (H) 80 - 100 MMHG  
 HCO3 (POC) 26.0 22 - 26 MMOL/L  
 sO2 (POC) 99 (H) 92 - 97 % Base deficit (POC) 2 mmol/L Site LEFT RADIAL Device: BIPAP    
 PEEP/CPAP (POC) 6 cmH2O  
 PIP (POC) 12 Allens test (POC) YES Specimen type (POC) ARTERIAL Total resp. rate 34 METABOLIC PANEL, BASIC Collection Time: 10/04/19  4:19 AM  
Result Value Ref Range Sodium 129 (L) 136 - 145 mmol/L Potassium 4.2 3.5 - 5.1 mmol/L Chloride 96 (L) 97 - 108 mmol/L  
 CO2 26 21 - 32 mmol/L Anion gap 7 5 - 15 mmol/L Glucose 538 (H) 65 - 100 mg/dL BUN 28 (H) 6 - 20 MG/DL  Creatinine 4.07 (H) 0.70 - 1.30 MG/DL  
 BUN/Creatinine ratio 7 (L) 12 - 20 GFR est AA 17 (L) >60 ml/min/1.73m2 GFR est non-AA 14 (L) >60 ml/min/1.73m2 Calcium 8.3 (L) 8.5 - 10.1 MG/DL  
LACTIC ACID Collection Time: 10/04/19  4:19 AM  
Result Value Ref Range Lactic acid 2.4 (HH) 0.4 - 2.0 MMOL/L  
POC G3 - PUL Collection Time: 10/04/19  4:29 AM  
Result Value Ref Range FIO2 (POC) 50 % pH (POC) 7.329 (L) 7.35 - 7.45    
 pCO2 (POC) 48.3 (H) 35.0 - 45.0 MMHG  
 pO2 (POC) 194 (H) 80 - 100 MMHG  
 HCO3 (POC) 25.4 22 - 26 MMOL/L  
 sO2 (POC) 100 (H) 92 - 97 % Base deficit (POC) 1 mmol/L Site LEFT RADIAL Device: BIPAP    
 PEEP/CPAP (POC) 6 cmH2O  
 PIP (POC) 18 Allens test (POC) YES Specimen type (POC) ARTERIAL Total resp. rate 29 Imaging: 
I have personally reviewed the patients radiographs and have reviewed the reports: 
COPD, mild pulmonary edema Kenna Ely MD

## 2019-10-04 NOTE — PROGRESS NOTES
Day 0 Pt undergoing HD in room during encounter, hard of hearing and on bipap. Admits some parasternal chest tightness, no other acute complaints. H&P of Dr Ruiz Kidney reviewed, agree with plan of care. Appreciate all consultants. Cont bronchodilatory therapy. Small elevation in trops likely demand ischemia. CXR shows vol overload, bibasilar atelectasis. pna can't be excluded but felt much less likely. Addendum: Pt bp into low 80s/30s and patient more short of breath during HD--given albumin prior to HD to help UF removal. Further UF removal aborted. BP remained low when I was back on the unit and pt was started on zeke. Wean the zeke as tolerated.

## 2019-10-04 NOTE — ED NOTES
TRANSFER - OUT REPORT: 
 
Verbal report given to Hui(name) on Mike Basurto  being transferred to [facility](unit) for routine progression of care Report consisted of patients Situation, Background, Assessment and  
Recommendations(SBAR). Information from the following report(s) SBAR, ED Summary, [facility] and Recent Results was reviewed with the receiving nurse. Lines:  
Peripheral IV 10/03/19 Left Forearm (Active) Site Assessment Clean, dry, & intact 10/3/2019 11:51 PM  
Phlebitis Assessment 0 10/3/2019 11:51 PM  
Infiltration Assessment 0 10/3/2019 11:51 PM  
Dressing Status Clean, dry, & intact 10/3/2019 11:51 PM  
Dressing Type Transparent 10/3/2019 11:51 PM  
Hub Color/Line Status Pink 10/3/2019 11:51 PM  
Alcohol Cap Used No 10/3/2019 11:51 PM  
   
Peripheral IV 10/04/19 Left Antecubital (Active) Site Assessment Clean, dry, & intact 10/4/2019  4:23 AM  
Phlebitis Assessment 0 10/4/2019  4:23 AM  
Infiltration Assessment 0 10/4/2019  4:23 AM  
Dressing Status Clean, dry, & intact 10/4/2019  4:23 AM  
Dressing Type Transparent 10/4/2019  4:23 AM  
Hub Color/Line Status Patent; Flushed 10/4/2019  4:23 AM  
Action Taken Blood drawn;Dressing reinforced 10/4/2019  4:23 AM  
Alcohol Cap Used Yes 10/4/2019  4:23 AM  
  
 
Opportunity for questions and clarification was provided. Patient transported with: 
 RN, RT, monitor, Janet Jackson

## 2019-10-04 NOTE — PERIOP NOTES
1503 -  
TRANSFER - IN REPORT: 
 
Verbal report received from Joe, [facility] (name) on Florentin Yañez  being received from PCU (unit) for ordered procedure Report consisted of patients Situation, Background, Assessment and  
Recommendations(SBAR). Information from the following report(s) SBAR, Kardex, MAR, Recent Results and Cardiac Rhythm NSR was reviewed with the receiving nurse. Opportunity for questions and clarification was provided. Assessment completed upon patients arrival to unit and care assumed. Patient is on BIPAP and unable to come off of BIPAP per Joe (patient nurse on PCU), called respiratory and spoke to Tab who stated that pt would have to be transported on BIPAP if patient left the room. If patient does need to leave the room, call Mir Alfaro at [phone].

## 2019-10-04 NOTE — PROGRESS NOTES
TRANSFER - IN REPORT: 
 
Verbal report received from NAILA Diaz(name) on Delona August  being received from PCU(unit) for urgent transfer Report consisted of patients Situation, Background, Assessment and  
Recommendations(SBAR). Information from the following report(s) SBAR, Intake/Output, MAR, Accordion, Recent Results and Med Rec Status was reviewed with the receiving nurse. Opportunity for questions and clarification was provided. Assessment completed upon patients arrival to unit and care assumed.

## 2019-10-04 NOTE — CDMP QUERY
Dr Luis Antonio Laura: 
 
Pt admitted with Ac hypercapnic respiratory Failure [id number] to PNE on superimposed COPD & fluid overload. \" 
 Pt noted to have elevated NTpBNP 32K, CXR findings of Cardiomegaly, pleural effusions, and mild pulmonary edema. CHF is most likely. Bibasilar atelectasis more likely than pneumonia. 10/4 Echo:  Left Ventricle: Normal wall thickness. Mildly dilated left ventricle. Severe systolic dysfunction. Estimated left ventricular ejection fraction is 26 - 30%. Calculated left ventricular ejection fraction is 29%. No regional wall motion abnormality noted Sridhar Montenegro If possible, please document in progress notes and d/c summary if you are evaluating and /or treating any of the following: ? Acute on Chronic Systolic CHF ? Acute on Chronic Diastolic CHF ? Acute on Chronic Systolic and Diastolic CHF ? Acute Systolic CHF ? Acute Systolic and Diastolic CHF ? Chronic Systolic CHF ? Cardiogenic pulmonary edema  
? Noncardiogenic pulmonary edema  
? Other, please specify ? Clinically unable to determine The medical record reflects the following: 
  Risk Factors: 79 yo M w/ CAD, ESRD, COPD, DM Clinical Indicators: 'volume overload', hypoxia in respiratory failure, LE 1+ edema,   CXR findings, Echo LVEF 29%. Treatment: emergent HD, cardiology & nephrology consult. Thank you,  
Adrien Tiwari, [address] [facility], 136 [facility], [address]  
[phone]

## 2019-10-04 NOTE — CONSULTS
Consultation Note NAME: Garima Rincon :  1929 MRN:  [id number] Date/Time:  10/4/2019 10:47 AM 
 
I have been asked to see this patient by Dr. Cristian Schulte  for advice/opinion re: ESKD. Assessment :    Plan: ESKD Pulmonary edema COPD exacerbation Hyponatremia Uncontrolled DM 
 MWF HD at [facility]; emergent HD today with UF as tolerated; iUF tomorrow to target weight challenge (have been TWC'ing at outpatient unit) Soft BP (SBP at outpatient unit ~ 100 to 110); will use albumin to facilitate UF Subjective: CHIEF COMPLAINT:  ESKD HISTORY OF PRESENT ILLNESS:    
Laura Sargent is a 80 y.o.   male who has a history of the below. Mr. Cheryl Sotelo is well known to me. Gets HD at [facility]. Usually goes to the [place] and was hospitalized there in August with PNA. He was noted to have some edema in the last 2 weeks and his TW was dropped. He presented to the ER with SOB. He is currently still sob on bipap. Getting an echo at the time of my visit. Limited history b/c dyspnea and bipap. No family present. The patient was seen on dialysis at 3:11 PM .  BP is stable, mildly low. Access is working well. Past Medical History:  
Diagnosis Date  Arthritis  CAD (coronary artery disease)   
 triple bipass  Cancer ([address] bladder  COPD (chronic obstructive pulmonary disease) (HCC) Past Surgical History:  
Procedure Laterality Date  CARDIAC SURG PROCEDURE UNLIST    
 triple bipass Social History Tobacco Use  Smoking status: Former Smoker  Smokeless tobacco: Never Used Substance Use Topics  Alcohol use: Yes Comment: occ History reviewed. No pertinent family history. Allergies Allergen Reactions  Niacin Anaphylaxis Prior to Admission medications Not on File REVIEW OF SYSTEMS:   
 []  Unable to obtain reliable ROS due to  [] mental status  [] sedated   [] intubated [x] Total of 12 systems reviewed as follows (limited b/c above): Constitutional: negative fever, negative chills, negative weight loss Eyes:   negative visual changes ENT:   negative sore throat, tongue or lip swelling Respiratory:  negative cough, + dyspnea Cards:  negative for chest pain, palpitations, +lower extremity edema GI:   negative for nausea, vomiting, diarrhea, and abdominal pain :  negative for frequency, dysuria Integument:  negative for rash and pruritus Heme:  negative for easy bruising and gum/nose bleeding Musculoskel: negative for myalgias,  back pain and muscle weakness Neuro:  negative for headaches, dizziness, vertigo Psych:  negative for feelings of anxiety, depression Travel?: none Objective: VITALS:   
Visit Vitals /65 Pulse 90 Temp 95 °F (35 °C) Resp 24 Ht 5' 7\" (1.702 m) Wt 78 kg (172 lb) SpO2 99% BMI 26.94 kg/m² PHYSICAL EXAM: 
Gen:  [x]  WD [x]  WN  [] cachectic []  thin []  obese []  disheveled [x]  ill apearing  []   Critical  []   Chronic    []  No acute distress HEENT:   [x] NC/AT/PERRLA/EOMI 
  [] pink conjunctivae      [] pale conjunctivae PERRL  [] yes  [] no      [] moist mucosa    [] dry mucosa 
  hearing intact to voice [x] yes  [] No 
              
NECK:   supple [x] yes  [] no        masses [] yes  [] No 
             thyroid  []  non tender  []  tender RESP:   [] CTA bilaterally/no wheezing/rhonchi/rales/crackles [] rhonchi bilaterally - no dullness  [] wheezing   [] rhonchi   [x] crackles  
  use of accessory muscles [x] yes [] no CARD:   [x]  regular rate and rhythm/No murmurs/rubs/gallops 
  murmur  [] yes ()  [] no      Rubs  [] yes  [] no       Gallops [] yes  [] no 
  Rate []  regular  []  irregular        carotid bruits  [] Right  []  Left LE edema [x] yes  [] no           JVP  []  yes   []  no 
 
ABD:    [x] soft/non distended/non tender/+bowel sounds/no HSM []  Rigid    tenderness [] yes [] no   Liver enlargement  []  yes []  no  
             Spleen enlargement  []  yes []  no     distended []  yes [] no  
  bowel sound  [] hypoactive   [] hyperactive LYMPH:    Neck []  yes [x]  no       Axillae []  yes []  no SKIN:   Rashes []  yes   [x]  no    Ulcers []  yes   []  no 
             [] tight to palpitation    skin turgor []  good  [] poor  [] decreased Cyanosis/clubbing []  yes []  no 
 
NEUR:   [x] cranial nerves II-XII grossly intact    
  [] Cranial nerves deficit 
               []  facial droop    []  slurred speech   [] aphasic  
  [] Strength normal     []  weakness  []  LUE  []   RUE/ []  LLE  []   RLE 
  follows commands  [x]  yes []  no        
 
PSYCH:   insight [] poor [] good   Alert and Oriented to  [x] person  [x] place  [x]  time  
                 [] depressed [] anxious [] agitated  [] lethargic [] stuporous  [] sedated LAB DATA REVIEWED:   
Recent Labs 10/04/19 
0009 WBC 20.0* HGB 12.1 HCT 38.8  Recent Labs 10/04/19 
0419 10/04/19 
0009 * 137  
K 4.2 4.6 CL 96* 103 CO2 26 24 BUN 28* 26* CREA 4.07* 3.96* * 308* CA 8.3* 8.5 Recent Labs 10/04/19 
0009 SGOT 24 ALT 19  
 TBILI 0.4 ALB 3.2*  
GLOB 4.3* No results for input(s): INR, PTP, APTT, INREXT in the last 72 hours. No results for input(s): FE, TIBC, PSAT, FERR in the last 72 hours. No results for input(s): PH, PCO2, PO2 in the last 72 hours. No results for input(s): CPK, CKMB in the last 72 hours. No lab exists for component: TROPONINI No results found for: [facility] Procedures: see electronic medical records for all procedures/Xrays and details which were not copied into this note but were reviewed prior to creation of Plan.   
________________________________________________________________________ 
  
 
___________________________________________________ Consulting Physician:  Edinson Villatoro MD

## 2019-10-04 NOTE — ED PROVIDER NOTES
EMERGENCY DEPARTMENT HISTORY AND PHYSICAL EXAM 
 
 
Date: 10/3/2019 Patient Name: Anayeli Chester Patient Age and Sex: 80 y.o. male History of Presenting Illness Chief Complaint Patient presents with  Respiratory Distress History Provided By: Patient and family HPI: Anayeli Chester Is a 80-year-old male past medical history of COPD, ESRD on dialysis presenting today with respiratory distress. Apparently the past several days he has had increasing shortness of breath but did not want to be evaluated in the emergency department. Tonight he went into his daughter's bedroom and said that he was having difficulty breathing and wanted to go to the hospital.  They report no fevers, cough. He took 3 nebulization treatments today without relief of his symptoms. He is unable to provide any further history given his respiratory distress. There are no other complaints, changes, or physical findings at this time. PCP: Karyn Asif MD 
 
No current facility-administered medications on file prior to encounter. No current outpatient medications on file prior to encounter. Past History Past Medical History: 
Past Medical History:  
Diagnosis Date  Arthritis  CAD (coronary artery disease) 2012  
 triple bipass  Cancer ([facility] [address] bladder  COPD (chronic obstructive pulmonary disease) ([facility]) Past Surgical History: 
Past Surgical History:  
Procedure Laterality Date  CARDIAC SURG PROCEDURE UNLIST  2012  
 triple bipass Family History: 
History reviewed. No pertinent family history. Social History: 
Social History Tobacco Use  Smoking status: Former Smoker  Smokeless tobacco: Never Used Substance Use Topics  Alcohol use: Yes Comment: occ  Drug use: Never Allergies: Allergies Allergen Reactions  Niacin Anaphylaxis Review of Systems Unable to provide a full ROS given respiratory distress.  
 
Physical Exam  
 
 Patient Vitals for the past 12 hrs: 
 Temp Pulse Resp BP SpO2  
10/04/19 0346  91 22 98/48 100 % 10/04/19 0330  93 24 100/45 100 % 10/04/19 0321     100 % 10/04/19 0315  94 23 97/47 100 % 10/04/19 0300 97.8 °F (36.6 °C) 94 26 102/42 100 % 10/04/19 0245  96 25 106/50 100 % 10/04/19 0230  94 26 107/45 100 % 10/04/19 0215  95 26 102/47 100 % 10/04/19 0200  99 (!) 33 115/51 99 % 10/04/19 0152     99 % 10/04/19 0145  97 28 104/47 99 % 10/04/19 0115  (!) 102 (!) 32 109/48 97 % 10/04/19 0005     97 % 10/03/19 2356    (!) 127/93   
10/03/19 2347     96 % 10/03/19 2339 98.6 °F (37 °C) (!) 123 (!) 40 129/55 96 % General: responds to voice, severe distress Eyes: EOMI, normal conjunctiva ENT: moist mucous membranes. Neck: Active, full ROM of neck. Skin: No rashes. no jaundice Lungs: Equal chest expansion. severe respiratory distress. scattered wheezing using supraclavicular accessory muscles, using intercostal accessory muscles and using abdominal accessory muscles, extremely diminished lung sounds Heart: tachycardic with a  regular rhythm     no peripheral edema   2+ radial pulses and DPs bilaterally Abd:  distended soft, nontender. No rebound tenderness. No guarding Back: Full ROM 
MSK: Full, active ROM in all 4 extremities. RUE with fistula in place Neuro: Person, Place, Time and Situation; normal speech;  
Psych: Cooperative with exam; Appropriate mood and affect Diagnostic Study Results Labs - Recent Results (from the past 12 hour(s)) EKG, 12 LEAD, INITIAL Collection Time: 10/03/19 11:52 PM  
Result Value Ref Range Ventricular Rate 123 BPM  
 Atrial Rate 123 BPM  
 P-R Interval 168 ms QRS Duration 142 ms  
 Q-T Interval 338 ms QTC Calculation (Bezet) 483 ms Calculated P Axis -6 degrees Calculated R Axis -52 degrees Calculated T Axis 83 degrees Diagnosis Sinus tachycardia Left axis deviation Nonspecific intraventricular block Cannot rule out Septal infarct , age undetermined No previous ECGs available CBC WITH AUTOMATED DIFF Collection Time: 10/04/19 12:09 AM  
Result Value Ref Range WBC 20.0 (H) 4.1 - 11.1 K/uL  
 RBC 3.91 (L) 4.10 - 5.70 M/uL  
 HGB 12.1 12.1 - 17.0 g/dL HCT 38.8 36.6 - 50.3 % MCV 99.2 (H) 80.0 - 99.0 FL  
 MCH 30.9 26.0 - 34.0 PG  
 MCHC 31.2 30.0 - 36.5 g/dL  
 RDW 16.5 (H) 11.5 - 14.5 % PLATELET 737 535 - 727 K/uL MPV 9.9 8.9 - 12.9 FL  
 NRBC 0.1 (H) 0  WBC ABSOLUTE NRBC 0.02 (H) 0.00 - 0.01 K/uL NEUTROPHILS 66 32 - 75 % LYMPHOCYTES 27 12 - 49 % MONOCYTES 7 5 - 13 % EOSINOPHILS 0 0 - 7 % BASOPHILS 0 0 - 1 % IMMATURE GRANULOCYTES 0 0.0 - 0.5 % ABS. NEUTROPHILS 13.2 (H) 1.8 - 8.0 K/UL  
 ABS. LYMPHOCYTES 5.4 (H) 0.8 - 3.5 K/UL  
 ABS. MONOCYTES 1.4 (H) 0.0 - 1.0 K/UL  
 ABS. EOSINOPHILS 0.0 0.0 - 0.4 K/UL  
 ABS. BASOPHILS 0.0 0.0 - 0.1 K/UL  
 ABS. IMM. GRANS. 0.0 0.00 - 0.04 K/UL  
 DF MANUAL    
 RBC COMMENTS NORMOCYTIC, NORMOCHROMIC METABOLIC PANEL, COMPREHENSIVE Collection Time: 10/04/19 12:09 AM  
Result Value Ref Range Sodium 137 136 - 145 mmol/L Potassium 4.6 3.5 - 5.1 mmol/L Chloride 103 97 - 108 mmol/L  
 CO2 24 21 - 32 mmol/L Anion gap 10 5 - 15 mmol/L Glucose 308 (H) 65 - 100 mg/dL BUN 26 (H) 6 - 20 MG/DL Creatinine 3.96 (H) 0.70 - 1.30 MG/DL  
 BUN/Creatinine ratio 7 (L) 12 - 20 GFR est AA 17 (L) >60 ml/min/1.73m2 GFR est non-AA 14 (L) >60 ml/min/1.73m2 Calcium 8.5 8.5 - 10.1 MG/DL Bilirubin, total 0.4 0.2 - 1.0 MG/DL  
 ALT (SGPT) 19 12 - 78 U/L  
 AST (SGOT) 24 15 - 37 U/L Alk. phosphatase 111 45 - 117 U/L Protein, total 7.5 6.4 - 8.2 g/dL Albumin 3.2 (L) 3.5 - 5.0 g/dL Globulin 4.3 (H) 2.0 - 4.0 g/dL A-G Ratio 0.7 (L) 1.1 - 2.2 NT-PRO BNP Collection Time: 10/04/19 12:09 AM  
Result Value Ref Range  NT pro-BNP 32,370 (H) <450 PG/ML  
TROPONIN I  
 Collection Time: 10/04/19 12:09 AM  
Result Value Ref Range Troponin-I, Qt. 0.17 (H) <0.05 ng/mL POC LACTIC ACID Collection Time: 10/04/19 12:11 AM  
Result Value Ref Range Lactic Acid (POC) 3.78 (HH) 0.40 - 2.00 mmol/L  
POC G3 - PUL Collection Time: 10/04/19 12:33 AM  
Result Value Ref Range FIO2 (POC) 50 % pH (POC) 7.188 (LL) 7.35 - 7.45    
 pCO2 (POC) 68.5 (H) 35.0 - 45.0 MMHG  
 pO2 (POC) 185 (H) 80 - 100 MMHG  
 HCO3 (POC) 26.0 22 - 26 MMOL/L  
 sO2 (POC) 99 (H) 92 - 97 % Base deficit (POC) 2 mmol/L Site LEFT RADIAL Device: BIPAP    
 PEEP/CPAP (POC) 6 cmH2O  
 PIP (POC) 12 Allens test (POC) YES Specimen type (POC) ARTERIAL Total resp. rate 34 Radiologic Studies -  
XR CHEST PORT Final Result IMPRESSION:  
  
Cardiomegaly, pleural effusions, and mild pulmonary edema. CHF is most likely. Bibasilar atelectasis more likely than pneumonia. Consider PA and lateral chest  
views when the patient can better tolerate. CT Results  (Last 48 hours) None CXR Results  (Last 48 hours) 10/04/19 0008  XR CHEST PORT Final result Impression:  IMPRESSION:  
   
Cardiomegaly, pleural effusions, and mild pulmonary edema. CHF is most likely. Bibasilar atelectasis more likely than pneumonia. Consider PA and lateral chest  
views when the patient can better tolerate. Narrative:  EXAM: XR CHEST PORT INDICATION: Hypoxia on room air this evening. COPD. COMPARISON: None available in the [employer] system TECHNIQUE: Upright portable chest AP view FINDINGS: Sternotomy wires and mediastinal clips indicate previous CABG. Cardiac  
monitoring wires overlie the thorax. Cardiomegaly is accentuated by technique. Aortic arch is not enlarged. The pulmonary vasculature is within normal limits. Small bilateral pleural effusions. Nonspecific bibasilar opacities. Possible interstitial pulmonary edema. The visualized bones and upper abdomen are  
age-appropriate. Medical Decision Making Differential Diagnosis: COPD exacerbation, CHF exacerbation, pneumonia, pneumothorax, ACS, electrolyte derangement I reviewed the vital signs, available nursing notes, past medical history, past surgical history, family history and social history and old medical records. On my interpretation, Laboratory workup is significant for white blood cell count of 20, hemoglobin is 12.1, elect lites remarkable for creatinine of 3.96, glucose is 308, proBNP is 32,370, troponin is 0.17, lactate is 3.78, pH 7.18, PCO2 of 68.5 On my interpretation of the radiology studies cardiomegaly with pleural effusions, bibasilar atelectasis, On my interpretation of the EKG sinus tachycardia, left axis deviation, rate of 123, QTC is 483, no ST elevation Management/ED course: Patient presents in acute respiratory distress. Initially with very distant lung sounds, wheezing throughout all lung fields, and tachypnea. Patient was using accessory respiratory muscles. Patient was placed on BiPAP, continuous albuterol, was given magnesium, and also IV steroids. He improved significantly over the next hour on BiPAP. His mental status improved, and he was no longer tachypneic with respiratory rates in the mid 20s. Patient's initial ABG shows acidosis with a pH of 7.18, and elevated PCO2. Patient's lactate was slightly elevated. Lung fields with pulmonary edema. Patient's white blood cell count was elevated to 20, productive cough at home, treated with anti-biotics here. No fluid resuscitation given the patient's history of end stage renal disease, and fluid overload. Ultimately the patient is admitted to the inpatient medicine service. Records obtained from the [place] and scanned into the chart. ED Course:  
Initial assessment performed.  The patients presenting problems have been discussed, and they are in agreement with the care plan formulated and outlined with them. I have encouraged them to ask questions as they arise throughout their visit. Procedures: 
 
Critical Care Time: CRITICAL CARE NOTE : 
 
12:01 AM 
 
IMPENDING DETERIORATION -Airway and Respiratory ASSOCIATED RISK FACTORS - Hypoxia MANAGEMENT- Bedside Assessment and Supervision of Care INTERPRETATION -  Xrays, Blood Gases, ECG and Blood Pressure INTERVENTIONS - vent mngmt CASE REVIEW - Hospitalist 
TREATMENT RESPONSE -Improved PERFORMED BY - Self NOTES   : 
 
I have spent 60 minutes of critical care time involved in lab review, consultations with specialist, family decision- making, bedside attention and documentation. During this entire length of time I was immediately available to the patient . Disposition: Admitted Admission Note: 
Patient is being admitted to the hospital by Service: Hospitalist.  The results of their tests and reasons for their admission have been discussed with them and available family. They convey agreement and understanding for the need to be admitted and for their admission diagnosis. Diagnosis Clinical Impression: 1. Acute on chronic respiratory failure with hypoxia and hypercapnia (HCC) 2. Cough Attestations: 
Farhat Benedict MD 
 
 
 
Please note that this dictation was completed with [employer], the [employer] voice recognition software. Quite often unanticipated grammatical, syntax, homophones, and other interpretive errors are inadvertently transcribed by the computer software. Please disregard these errors. Please excuse any errors that have escaped final proofreading. Thank you.

## 2019-10-04 NOTE — ED TRIAGE NOTES
Assumed care of pt from EMS. Pt pulse ox was 89% on RA. Pt on a simple mask now at 8L and pulse ox is 96%. Pt took 3 neb treatments at home and EMS gave another en route. Pt has hx of COPD. Pt on monitor x3. Pt reponds to voice and is [place]. Pt has fistula on right arm. MD at bedside.

## 2019-10-04 NOTE — CONSULTS
[address] [place] [address]  [phone] [address] [facility] Date of  Admission: 10/3/2019 11:36 PM  
 
Admission type:Emergency Consult for: elevated trop Consult by: hospitalist  
 
 Subjective:  
 
Kavitha Yuen is a 80 y.o. male with PMH CABG, COPD, ESRD, bladder and pancreatic cancer, AAA, GERD, HFrEF who was admitted for ARF (acute respiratory failure) ([facility] [address] [J96.00]. Per ED provider note Kavitha Yuen presented to the ED with c/o shortness of breath that worsened over several days. Cardiology consulted for elevated trop of 0.17. On assessment, Kavitha Yuen is laying in bed on bipap with labored breathing. He endorses the above. He denies missing any dialysis sessions. He felt a band of tightness across his chest when his breathing was worse, but it is gone now. No c/o chest pain. He states he hasn't had any trouble with his heart since his CABG (2012?). He receives his usual care at the [place]. Difficult to perform full ROS due to bipap and patient [place]. Kavitha Yuen  follows with the [place] for cardiology. No records in our system. Patient Active Problem List  
 Diagnosis Date Noted  ARF (acute respiratory failure) ([facility] [address] 10/04/2019 Papi Webb MD 
Past Medical History:  
Diagnosis Date  Arthritis  CAD (coronary artery disease) 2012  
 triple bipass  Cancer ([facility] [address] bladder  COPD (chronic obstructive pulmonary disease) ([facility]) Social History Socioeconomic History  Marital status:  Spouse name: Not on file  Number of children: Not on file  Years of education: Not on file  Highest education level: Not on file Tobacco Use  Smoking status: Former Smoker  Smokeless tobacco: Never Used Substance and Sexual Activity  Alcohol use: Yes Comment: occ  Drug use: Never Allergies Allergen Reactions  Niacin Anaphylaxis History reviewed. No pertinent family history. Current Facility-Administered Medications Medication Dose Route Frequency  [START ON 10/6/2019] levoFLOXacin (LEVAQUIN) 500 mg in D5W IVPB  500 mg IntraVENous Q48H  
 [START ON 10/6/2019] vancomycin (VANCOCIN) 1,000 mg in 0.9% sodium chloride (MBP/ADV) 250 mL  1,000 mg IntraVENous Q48H  piperacillin-tazobactam (ZOSYN) 3.375 g in 0.9% sodium chloride (MBP/ADV) 100 mL  3.375 g IntraVENous Q12H  
 sodium chloride (NS) flush 5-40 mL  5-40 mL IntraVENous Q8H  
 sodium chloride (NS) flush 5-40 mL  5-40 mL IntraVENous PRN  
 acetaminophen (TYLENOL) tablet 650 mg  650 mg Oral Q6H PRN  
 ondansetron (ZOFRAN) injection 4 mg  4 mg IntraVENous Q6H PRN  
 bisacodyl (DULCOLAX) tablet 5 mg  5 mg Oral DAILY PRN  
 heparin (porcine) injection 5,000 Units  5,000 Units SubCUTAneous Q8H  
 albuterol-ipratropium (DUO-NEB) 2.5 MG-0.5 MG/3 ML  3 mL Nebulization Q4H PRN  
 methylPREDNISolone (PF) (SOLU-MEDROL) injection 40 mg  40 mg IntraVENous Q8H  
 famotidine (PF) (PEPCID) 20 mg in sodium chloride 0.9% 10 mL injection  20 mg IntraVENous Q24H  
 albuterol-ipratropium (DUO-NEB) 2.5 MG-0.5 MG/3 ML  3 mL Nebulization Q4H RT  
 insulin lispro (HUMALOG) injection   SubCUTAneous AC&HS  
 glucose chewable tablet 16 g  4 Tab Oral PRN  
 dextrose (D50W) injection syrg 12.5-25 g  12.5-25 g IntraVENous PRN  
 glucagon (GLUCAGEN) injection 1 mg  1 mg IntraMUSCular PRN  
 albumin human 25% (BUMINATE) solution 12.5 g  12.5 g IntraVENous DIALYSIS PRN Review of Symptoms:  
11 systems reviewed, negative other than as stated in the HPI Objective:  
  
Visit Vitals /65 Pulse 90 Temp 95 °F (35 °C) Resp 24 Ht 5' 7\" (1.702 m) Wt 78 kg (172 lb) SpO2 99% BMI 26.94 kg/m² Physical:  
General: elderly  male resting in bed with labored breathing on bipap. Heart: RRR, no m/S3/JVD Lungs: dim, labored. On bipap Abdomen: Soft, +BS, NTND Extremities: LE riddhi +DP/PT, trace edema BLE;  1+ BUE Neurologic: Grossly normal; JENNI [facility] Data Review:  
Recent Labs 10/04/19 
0009 WBC 20.0* HGB 12.1 HCT 38.8  Recent Labs 10/04/19 
0419 10/04/19 
0009 * 137  
K 4.2 4.6 CL 96* 103 CO2 26 24 * 308* BUN 28* 26* CREA 4.07* 3.96* CA 8.3* 8.5 ALB  --  3.2* TBILI  --  0.4 SGOT  --  24 ALT  --  19 Recent Labs 10/04/19 
0009 TROIQ 0.17* Intake/Output Summary (Last 24 hours) at 10/4/2019 1144 Last data filed at 10/4/2019 1965 Gross per 24 hour Intake 50 ml Output  Net 50 ml  
  
 
Cardiographics Telemetry: SR 
ECG: ST with 1st degree and LBBB vs a fib Echocardiogram: ordered CXRAY: \"Cardiomegaly, pleural effusions, and mild pulmonary edema. CHF is most likely. Bibasilar atelectasis more likely than pneumonia. \" 
 
 
 Assessment:  
  
 Active Problems: 
  ARF (acute respiratory failure) ([facility] [address] (10/4/2019) Plan:  
 
Garima Rincon is a 80 y.o. male who was admitted for ARF (acute respiratory failure) ([facility] [address] [J96.00] cardiology consulted for troponin of 0.16. CXR with edema and effusions. WBC 20; lactic acid 3.78; creatinine ~4; pH initial 7.188; pCO2 68.5  ; proBNP 18210 · Small elevation in troponin likely 2/2 strain from respiratory failure/hypoxia. No home CAD meds entered yet, but patient not appropriate for PO at this time with bipap. · ASA and statin when PO more appropriate. Hold off on BB for borderline BPs and emergent HD needed. · ECHO pending. [address] records mention an EF of 40% 10/17. · Patient to receive emergent HD for pulmonary edema. abx for possible PNA per hospitalist 
· Nephrology following ESRD Thank you for consulting [facility] Keysha Copeland, MEI 
DNP, RN, [facility] Patient seen and examined by me with nurse practitioner.   I personally performed all components of the history, physical, and medical decision making and agree with the assessment and plan as noted. F/u Echo results.   
 
Luz Ackerman MD

## 2019-10-04 NOTE — H&P
Hospitalist Admission Note NAME: Natalie Fuller :  1929 MRN:  [id number] Date/Time:  10/4/2019 5:25 AM 
 
Patient PCP: Willem Rojas MD 
______________________________________________________________________ Given the patient's current clinical presentation, I have a high level of concern for decompensation if discharged from the emergency department. Complex decision making was performed, which includes reviewing the patient's available past medical records, laboratory results, and x-ray films. My assessment of this patient's clinical condition and my plan of care is as follows. Assessment / Plan: 
 
Acute hypercapnic respiratory failure most likely secondary to pneumonia on superimposed COPD exacerbation and fluid overload Admit patient to stepdown Keep patient on BiPAP Start patient on IV Solu-Medrol Start patient on IV antibiotic Nephrology consultation for hemodialysis Follow-up repeat ABG 
 
ESRD  
-Nephrology consult Coronary artery disease Continue home medication when list available History of bladder cancer Code Status: Full Surrogate Decision Maker:SERJIO WILSON 
 
DVT Prophylaxis: Heparin GI Prophylaxis: not indicated Subjective: CHIEF COMPLAINT: SOB HISTORY OF PRESENT ILLNESS:    
 
80years old male from home with past medical history significant for COPD, end-stage renal disease, coronary artery disease, bladder cancer presented to the hospital for worsening shortness of breath started  several days ago blood work in ED was significant for elevated white blood cell count 20.0 chest x-ray was done and show pleural effusion and mild pulmonary edema, ABG was done and show elevated PCO2 68, patient was placed on BiPAP. We were asked to admit for work up and evaluation of the above problems. Past Medical History:  
Diagnosis Date  Arthritis  CAD (coronary artery disease)  triple bipass  Cancer ([facility] [address] bladder  COPD (chronic obstructive pulmonary disease) (HCC) Past Surgical History:  
Procedure Laterality Date  CARDIAC SURG PROCEDURE UNLIST  2012  
 triple bipass Social History Tobacco Use  Smoking status: Former Smoker  Smokeless tobacco: Never Used Substance Use Topics  Alcohol use: Yes Comment: occ History reviewed Family history. unable to obtained patient in moderate distress and on BIPAP and unable to give HX Allergies Allergen Reactions  Niacin Anaphylaxis Prior to Admission medications Not on File REVIEW OF SYSTEMS:    
I am not able to complete the review of systems because: The patient is intubated and sedated The patient has altered mental status due to his acute medical problems The patient has baseline aphasia from prior stroke(s) The patient has baseline dementia and is not reliable historian  
y The patient is in acute medical distress and unable to provide information Total of 12 systems reviewed as follows:   
   POSITIVE= underlined text  Negative = text not underlined General:  fever, chills, sweats, generalized weakness, weight loss/gain,  
   loss of appetite Eyes:    blurred vision, eye pain, loss of vision, double vision ENT:    rhinorrhea, pharyngitis Respiratory:   cough, sputum production, SOB, GANNON, wheezing, pleuritic pain  
Cardiology:   chest pain, palpitations, orthopnea, PND, edema, syncope Gastrointestinal:  abdominal pain , N/V, diarrhea, dysphagia, constipation, bleeding Genitourinary:  frequency, urgency, dysuria, hematuria, incontinence Muskuloskeletal :  arthralgia, myalgia, back pain Hematology:  easy bruising, nose or gum bleeding, lymphadenopathy Dermatological: rash, ulceration, pruritis, color change / jaundice Endocrine:   hot flashes or polydipsia Neurological:  headache, dizziness, confusion, focal weakness, paresthesia, 
 Speech difficulties, memory loss, gait difficulty Psychological: Feelings of anxiety, depression, agitation Objective: VITALS:   
Visit Vitals BP 98/48 (BP 1 Location: Left arm, BP Patient Position: At rest) Pulse 91 Temp 97.8 °F (36.6 °C) Resp 22 Ht 5' 7\" (1.702 m) Wt 78 kg (172 lb) SpO2 100% BMI 26.94 kg/m² PHYSICAL EXAM: 
 
General:    Mild  distress, appears stated age. HEENT: Atraumatic, anicteric sclerae, pink conjunctivae No oral ulcers, mucosa moist, throat clear, dentition fair Neck:  Supple, symmetrical,  thyroid: non tender Lungs:   Diminished breath sound b/l Chest wall:  No tenderness  No Accessory muscle use. Heart:   Regular  rhythm,  No  murmur   B/l  edema Abdomen:   Soft, non-tender. Not distended. Bowel sounds normal 
Extremities: No cyanosis. No clubbing,   
  Skin turgor normal, Capillary refill normal, Radial dial pulse 2+ Skin:     Not pale. Not Jaundiced  No rashes Psych:  Good insight. Not depressed. Not anxious or agitated. Neurologic      Alert and follow command, on BIPAP   _______________________________________________________________________ Care Plan discussed with: 
  Comments Patient y Family RN y   
Care Manager Consultant:     
_______________________________________________________________________ Expected  Disposition:  
Home with Family y HH/PT/OT/RN   
SNF/LTC   
INGA   
________________________________________________________________________ TOTAL TIME:  60  Minutes Critical Care Provided     Minutes non procedure based Comments  
 y Reviewed previous records  
>50% of visit spent in counseling and coordination of care y Discussion with patient and/or family and questions answered 
  
 
________________________________________________________________________ Signed:  Salima Carmona MD 
 
Procedures: see electronic medical records for all procedures/Xrays and details which were not copied into this note but were reviewed prior to creation of Plan. LAB DATA REVIEWED:   
Recent Results (from the past 24 hour(s)) EKG, 12 LEAD, INITIAL Collection Time: 10/03/19 11:52 PM  
Result Value Ref Range Ventricular Rate 123 BPM  
 Atrial Rate 123 BPM  
 P-R Interval 168 ms QRS Duration 142 ms  
 Q-T Interval 338 ms QTC Calculation (Bezet) 483 ms Calculated P Axis -6 degrees Calculated R Axis -52 degrees Calculated T Axis 83 degrees Diagnosis Sinus tachycardia Left axis deviation Nonspecific intraventricular block Cannot rule out Septal infarct , age undetermined No previous ECGs available CBC WITH AUTOMATED DIFF Collection Time: 10/04/19 12:09 AM  
Result Value Ref Range WBC 20.0 (H) 4.1 - 11.1 K/uL  
 RBC 3.91 (L) 4.10 - 5.70 M/uL  
 HGB 12.1 12.1 - 17.0 g/dL HCT 38.8 36.6 - 50.3 % MCV 99.2 (H) 80.0 - 99.0 FL  
 MCH 30.9 26.0 - 34.0 PG  
 MCHC 31.2 30.0 - 36.5 g/dL  
 RDW 16.5 (H) 11.5 - 14.5 % PLATELET 339 960 - 323 K/uL MPV 9.9 8.9 - 12.9 FL  
 NRBC 0.1 (H) 0  WBC ABSOLUTE NRBC 0.02 (H) 0.00 - 0.01 K/uL NEUTROPHILS 66 32 - 75 % LYMPHOCYTES 27 12 - 49 % MONOCYTES 7 5 - 13 % EOSINOPHILS 0 0 - 7 % BASOPHILS 0 0 - 1 % IMMATURE GRANULOCYTES 0 0.0 - 0.5 % ABS. NEUTROPHILS 13.2 (H) 1.8 - 8.0 K/UL  
 ABS. LYMPHOCYTES 5.4 (H) 0.8 - 3.5 K/UL  
 ABS. MONOCYTES 1.4 (H) 0.0 - 1.0 K/UL  
 ABS. EOSINOPHILS 0.0 0.0 - 0.4 K/UL  
 ABS. BASOPHILS 0.0 0.0 - 0.1 K/UL  
 ABS. IMM. GRANS. 0.0 0.00 - 0.04 K/UL  
 DF MANUAL    
 RBC COMMENTS NORMOCYTIC, NORMOCHROMIC METABOLIC PANEL, COMPREHENSIVE Collection Time: 10/04/19 12:09 AM  
Result Value Ref Range Sodium 137 136 - 145 mmol/L Potassium 4.6 3.5 - 5.1 mmol/L Chloride 103 97 - 108 mmol/L  
 CO2 24 21 - 32 mmol/L Anion gap 10 5 - 15 mmol/L Glucose 308 (H) 65 - 100 mg/dL BUN 26 (H) 6 - 20 MG/DL  Creatinine 3.96 (H) 0.70 - 1.30 MG/DL  
 BUN/Creatinine ratio 7 (L) 12 - 20 GFR est AA 17 (L) >60 ml/min/1.73m2 GFR est non-AA 14 (L) >60 ml/min/1.73m2 Calcium 8.5 8.5 - 10.1 MG/DL Bilirubin, total 0.4 0.2 - 1.0 MG/DL  
 ALT (SGPT) 19 12 - 78 U/L  
 AST (SGOT) 24 15 - 37 U/L Alk. phosphatase 111 45 - 117 U/L Protein, total 7.5 6.4 - 8.2 g/dL Albumin 3.2 (L) 3.5 - 5.0 g/dL Globulin 4.3 (H) 2.0 - 4.0 g/dL A-G Ratio 0.7 (L) 1.1 - 2.2 NT-PRO BNP Collection Time: 10/04/19 12:09 AM  
Result Value Ref Range NT pro-BNP 32,370 (H) <450 PG/ML  
TROPONIN I Collection Time: 10/04/19 12:09 AM  
Result Value Ref Range Troponin-I, Qt. 0.17 (H) <0.05 ng/mL POC LACTIC ACID Collection Time: 10/04/19 12:11 AM  
Result Value Ref Range Lactic Acid (POC) 3.78 (HH) 0.40 - 2.00 mmol/L  
POC G3 - PUL Collection Time: 10/04/19 12:33 AM  
Result Value Ref Range FIO2 (POC) 50 % pH (POC) 7.188 (LL) 7.35 - 7.45    
 pCO2 (POC) 68.5 (H) 35.0 - 45.0 MMHG  
 pO2 (POC) 185 (H) 80 - 100 MMHG  
 HCO3 (POC) 26.0 22 - 26 MMOL/L  
 sO2 (POC) 99 (H) 92 - 97 % Base deficit (POC) 2 mmol/L Site LEFT RADIAL Device: BIPAP    
 PEEP/CPAP (POC) 6 cmH2O  
 PIP (POC) 12 Allens test (POC) YES Specimen type (POC) ARTERIAL Total resp. rate 34 METABOLIC PANEL, BASIC Collection Time: 10/04/19  4:19 AM  
Result Value Ref Range Sodium 129 (L) 136 - 145 mmol/L Potassium 4.2 3.5 - 5.1 mmol/L Chloride 96 (L) 97 - 108 mmol/L  
 CO2 26 21 - 32 mmol/L Anion gap 7 5 - 15 mmol/L Glucose 538 (H) 65 - 100 mg/dL BUN 28 (H) 6 - 20 MG/DL Creatinine 4.07 (H) 0.70 - 1.30 MG/DL  
 BUN/Creatinine ratio 7 (L) 12 - 20 GFR est AA 17 (L) >60 ml/min/1.73m2 GFR est non-AA 14 (L) >60 ml/min/1.73m2 Calcium 8.3 (L) 8.5 - 10.1 MG/DL  
LACTIC ACID Collection Time: 10/04/19  4:19 AM  
Result Value Ref Range Lactic acid 2.4 (HH) 0.4 - 2.0 MMOL/L  
POC G3 - PUL  Collection Time: 10/04/19  4:29 AM  
 Result Value Ref Range FIO2 (POC) 50 % pH (POC) 7.329 (L) 7.35 - 7.45    
 pCO2 (POC) 48.3 (H) 35.0 - 45.0 MMHG  
 pO2 (POC) 194 (H) 80 - 100 MMHG  
 HCO3 (POC) 25.4 22 - 26 MMOL/L  
 sO2 (POC) 100 (H) 92 - 97 % Base deficit (POC) 1 mmol/L Site LEFT RADIAL Device: BIPAP    
 PEEP/CPAP (POC) 6 cmH2O  
 PIP (POC) 18 Allens test (POC) YES Specimen type (POC) ARTERIAL Total resp. rate 29

## 2019-10-04 NOTE — PROGRESS NOTES
Acute hypercapnic respiratory failure most likely secondary to pneumonia on superimposed COPD exacerbation and fluid overload Admit patient to stepdown Keep patient on BiPAP Start patient on IV Solu-Medrol Start patient on IV antibiotic Nephrology consultation for hemodialysis Follow-up repeat ABG Coronary artery disease Continue home medication when list available History of bladder cancer 80 years old male from home with past medical history significant for COPD, end-stage renal disease, coronary artery disease, bladder cancer presented to the hospital for worsening shortness of breath started several days ago blood work in ED was significant for elevated white blood cell count 20.0 chest x-ray was done and show pleural effusion and mild pulmonary edema, ABG was done and show elevated PCO2 68, patient was placed on BiPAP.

## 2019-10-04 NOTE — PROGRESS NOTES
Pharmacy Automatic Renal Dosing Protocol - Antimicrobials Indication for Antimicrobials: HAP Current Regimen of Each Antimicrobial: 
Vancomycin 1500 mg IV load + 1000 mg IV every 48 hours (start 10/4, day 1) Zosyn 3.375 g IV every 8 hours (start 10/4, day 1) Levaquin 750 mg IV every 24 hrs (start 10/4, day 1) Previous Antimicrobial Therapy: 
 
Goal Level: VANCOMYCIN TROUGH GOAL RANGE Vancomycin Trough: 15 - 20 mcg/mL  (AUC: 400 - 600 mg/hr/Liter/day) Date Dose & Interval Measured (mcg/mL) Extrapolated (mcg/mL) Date & time of next level: after first maintenance dose to be given 10/6 Significant Cultures:  
none Radiology / Imaging results: (X-ray, CT scan or MRI):  
10/4 xr chest port - Cardiomegaly, pleural effusions, and mild pulmonary edema. CHF is most likely. Bibasilar atelectasis more likely than pneumonia. Consider PA and lateral chest 
views when the patient can better tolerate. Paralysis, amputations, malnutrition:  
 
Labs: 
Recent Labs 10/04/19 
0009 CREA 3.96* BUN 26* WBC 20.0* Temp (24hrs), Av.6 °F (37 °C), Min:98.6 °F (37 °C), Max:98.6 °F (37 °C) Creatinine Clearance (mL/min) or Dialysis: 11.6 ml/min Impression/Plan:  
Adjusted zosyn to every 12 hours per renal dosing protocol Adjusted levaquin to 750 mg IV once then 500 mg IV every 48 hours per renal dosing protocol Ordered vancomycin 1500 mg IV load + 1000 mg IV every 48 hours for an anticipated trough of 16.6 mcg/ml Is patient dialysis patient? Vanc maintenance dose may need to be adjusted Antimicrobial stop date TBD BMP ordered Pharmacy will follow daily and adjust medications as appropriate for renal function and/or serum levels. Thank you, Jennifer Smallwood, PHARMD 
 
Recommended duration of therapy 
http://[facility]/[place]/[facility]/Pharmacy/Clinical%20Companion/Duration%20of%20ABX%20therapy. docx Renal Dosing [email or web address]/[facility]/Pharmacy/Clinical%20Companion/Renal%[email or web address]. pdf

## 2019-10-05 NOTE — CONSULTS
[facility] Pulmonary, Critical Care, and Sleep Medicine Critical care consult note Name: Burgess Soriano MRN: [id number] : 1929 Hospital: [address] Date: 10/5/2019 IMPRESSION:  
· Acute hypoxic/hypercapnic respiratory failure- failed bipap and is now intubated · Vent day 2 
· COPD with acute exacerbation - regularly a patient at the [place] (on Symbicort and albuterol)- sound b= bronchospastic and tight · Acute pulmonary edema/ bilateral infiltrates · Hyponatremia · ESRD · CAD- mild bump in  troponin · DM · Cardiomyopathy - lvef 29 % · Hypotension- zeke 100 ucg ·  central access - line placed  By anesthesia 10/4 -  RECOMMENDATIONS:  
·  full vent support · I will start him on scheduled bronchodilators- see orders · Systemic corticosteroids · Empiric antibiotics · Send sputum now that he is intubated · Needs volume off with hemodialysis- noted intolerance yesterday and to get  uf today ·  bsl mgt · Check serial/follow up cardiac enzymes- Cards is seeing · DVT prophylaxis Subjective: This patient has been seen and evaluated at the request of Dr. Amy Young for COPD and respiratory failure. Patient is a 80 y.o. male former smoker with COPD and ESRD, typically gets his care at the [place]. He noted increasing dyspnea for several days PTA, and last night it became severe enough that he felt he needed to come to the ER. He was found to be in hypercapnic respiratory failure, and he was started on BiPAP. Still feeling short of breath. Minimal sputum production, no fevers. 10/5- intubated over night . Sedated on propofol 30 ucg and has increased rr drive Past Medical History:  
Diagnosis Date  Arthritis  CAD (coronary artery disease) 2012  
 triple bipass  Cancer ([address] bladder  COPD (chronic obstructive pulmonary disease) (HCC) Past Surgical History:  
Procedure Laterality Date  CARDIAC SURG PROCEDURE UNLIST  2012  
 triple bipass Prior to Admission medications Medication Sig Start Date End Date Taking? Authorizing Provider  
albuterol (PROVENTIL VENTOLIN) 2.5 mg /3 mL (0.083 %) nebu 2.5 mg by Nebulization route every six (6) hours as needed (wheezing, S.o.b.). Yes Provider, Historical  
ipratropium (ATROVENT) 0.02 % soln 0.5 mg by Nebulization route every six (6) hours as needed (wheezing, sob.). Yes Provider, Historical  
atorvastatin (LIPITOR) 80 mg tablet Take 40 mg by mouth daily. Yes Provider, Historical  
finasteride (PROSCAR) 5 mg tablet Take 5 mg by mouth daily. Yes Provider, Historical  
budesonide (PULMICORT FLEXHALER) 90 mcg/actuation aepb inhaler Take 1 Puff by inhalation daily as needed (emergent sob). Yes Provider, Historical  
 
Allergies Allergen Reactions  Niacin Anaphylaxis Social History Tobacco Use  Smoking status: Former Smoker  Smokeless tobacco: Never Used Substance Use Topics  Alcohol use: Yes Comment: occ History reviewed. No pertinent family history. Current Facility-Administered Medications Medication Dose Route Frequency  propofol (DIPRIVAN) infusion  0-50 mcg/kg/min IntraVENous TITRATE  [START ON 10/6/2019] levoFLOXacin (LEVAQUIN) 500 mg in D5W IVPB  500 mg IntraVENous Q48H  
 [START ON 10/6/2019] vancomycin (VANCOCIN) 1,000 mg in 0.9% sodium chloride (MBP/ADV) 250 mL  1,000 mg IntraVENous Q48H  piperacillin-tazobactam (ZOSYN) 3.375 g in 0.9% sodium chloride (MBP/ADV) 100 mL  3.375 g IntraVENous Q12H  
 sodium chloride (NS) flush 5-40 mL  5-40 mL IntraVENous Q8H  
 heparin (porcine) injection 5,000 Units  5,000 Units SubCUTAneous Q8H  
 methylPREDNISolone (PF) (SOLU-MEDROL) injection 40 mg  40 mg IntraVENous Q8H  
 famotidine (PF) (PEPCID) 20 mg in sodium chloride 0.9% 10 mL injection  20 mg IntraVENous Q24H  
 albuterol-ipratropium (DUO-NEB) 2.5 MG-0.5 MG/3 ML  3 mL Nebulization Q4H RT  
 insulin lispro (HUMALOG) injection   SubCUTAneous AC&HS  
 PHENYLephrine (PF)(DARIUS-SYNEPHRINE) 30 mg in 0.9% sodium chloride 250 mL infusion   mcg/min IntraVENous TITRATE  alcohol 62% (NOZIN) nasal  1 Ampule  1 Ampule Topical Q12H Review of Systems: A comprehensive review of systems was negative except for that written in the HPI. Not able to obtain from pt as on  Life support Objective:  
Vital Signs:   
Visit Vitals /42 Pulse 87 Temp 99.1 °F (37.3 °C) Resp 23 Ht 5' 7\" (1.702 m) Wt 72.6 kg (160 lb 0.9 oz) SpO2 96% BMI 25.07 kg/m² O2 Device: Ventilator O2 Flow Rate (L/min): 8 l/min Temp (24hrs), Av.8 °F (37.1 °C), Min:98.4 °F (36.9 °C), Max:99.3 °F (37.4 °C) Intake/Output:  
Last shift:      No intake/output data recorded. Last 3 shifts: 10/03 1901 - 10/05 0700 In: 740.3 [I.V.:740.3] Out: 1000 Intake/Output Summary (Last 24 hours) at 10/5/2019 1019 Last data filed at 10/5/2019 0700 Gross per 24 hour Intake 690.3 ml Output 1000 ml Net -309.7 ml Physical Exam:  
General:   sedated on vent Head:  Normocephalic, without obvious abnormality, atraumatic. Eyes:  Conjunctivae/corneas clear. perrl Nose: Nares normal.  
Throat:  intubated Neck: Supple, symmetrical, trachea midline Back:   Not examined Lungs:    rales , wheezes and tight - increase   wob Chest wall:  No obvious  tenderness or deformity. Heart:  Regular rate and rhythm Abdomen:   Soft, non-tender. Bowel sounds normal.    
Extremities: Extremities normal, atraumatic, no cyanosis or edema.scd on   
Skin: Skin color, texture, turgor normal. No rashes or lesions Lymph nodes: Cervical, supraclavicular nodes normal.  
Neurologic:   Sedated Data review:  
 
Recent Results (from the past 24 hour(s)) GLUCOSE, POC Collection Time: 10/04/19 11:17 AM  
Result Value Ref Range Glucose (POC) 198 (H) 65 - 100 mg/dL Performed by Donna Healy ECHO ADULT COMPLETE Collection Time: 10/04/19 11:50 AM  
Result Value Ref Range LV E' Lateral Velocity 7.93 cm/s LV E' Septal Velocity 4.01 cm/s Ao Root D 3.35 cm Aortic Valve Systolic Peak Velocity 017.15 cm/s AoV VTI 50.09 cm Aortic Valve Area by Continuity of Peak Velocity 1.4 cm2 Aortic Valve Area by Continuity of VTI 1.3 cm2 AoV PG 18.1 mmHg LVIDd 5.01 4.2 - 5.9 cm  
 LVPWd 1.08 (A) 0.6 - 1.0 cm LVIDs 4.32 cm IVSd 1.09 (A) 0.6 - 1.0 cm IVSs 1.39 cm  
 LVOT d 2.08 cm  
 LVOT Peak Velocity 87.07 cm/s LVOT Peak Gradient 3.0 mmHg LVOT VTI 19.04 cm  
 MVA (PHT) 5.6 cm2  
 MV E Robin 100.76 cm/s Left Atrium to Aortic Root Ratio 1.25 Aortic Valve Systolic Mean Gradient 62.6 mmHg LVOT Cardiac Output 5.6 L/min LV Mass .5 (A) 88 - 224 g LV Mass AL Index 126.8 49 - 115 g/m2 LVPWs 0.90 cm  
 E/E' lateral 12.71   
 E/E' septal 25.13   
 E/E' ratio (averaged) 18.92 Mitral Valve E Wave Deceleration Time 120.7 ms  
 Mitral Valve Pressure Half-time 39.2 ms Left Atrium Major Axis 4.20 cm Pulmonic Valve Max Velocity 94.31 cm/s LVOT SV 64.9 ml  
 WONG/BSA Pk Robin 0.7 cm2/m2 WONG/BSA VTI 0.7 cm2/m2 PV peak gradient 3.6 mmHg PROCALCITONIN Collection Time: 10/04/19 11:59 AM  
Result Value Ref Range Procalcitonin 1.3 ng/mL LACTIC ACID Collection Time: 10/04/19 11:59 AM  
Result Value Ref Range Lactic acid 3.4 (HH) 0.4 - 2.0 MMOL/L  
CK W/ CKMB & INDEX Collection Time: 10/04/19 11:59 AM  
Result Value Ref Range  39 - 308 U/L  
 CK - MB 19.4 (H) <3.6 NG/ML  
 CK-MB Index 13.7 (H) 0.0 - 2.5    
TROPONIN I Collection Time: 10/04/19 11:59 AM  
Result Value Ref Range Troponin-I, Qt. 2.38 (H) <0.05 ng/mL HEMOGLOBIN A1C WITH EAG Collection Time: 10/04/19 11:59 AM  
Result Value Ref Range Hemoglobin A1c 5.3 4.2 - 6.3 % Est. average glucose 105 mg/dL GLUCOSE, POC  
 Collection Time: 10/04/19  4:57 PM  
Result Value Ref Range Glucose (POC) 129 (H) 65 - 100 mg/dL Performed by Vimal Jacobsen TROPONIN I Collection Time: 10/04/19  5:31 PM  
Result Value Ref Range Troponin-I, Qt. 4.39 (H) <0.05 ng/mL CK W/ CKMB & INDEX Collection Time: 10/04/19  5:31 PM  
Result Value Ref Range  39 - 308 U/L  
 CK - MB 22.7 (H) <3.6 NG/ML  
 CK-MB Index 10.0 (H) 0.0 - 2.5    
TROPONIN I Collection Time: 10/04/19  5:31 PM  
Result Value Ref Range Troponin-I, Qt. 4.45 (H) <0.05 ng/mL GLUCOSE, POC Collection Time: 10/04/19 11:49 PM  
Result Value Ref Range Glucose (POC) 158 (H) 65 - 100 mg/dL Performed by Desiree Terrell. Maldonado Vargas METABOLIC PANEL, COMPREHENSIVE Collection Time: 10/05/19  4:01 AM  
Result Value Ref Range Sodium 138 136 - 145 mmol/L Potassium 5.0 3.5 - 5.1 mmol/L Chloride 102 97 - 108 mmol/L  
 CO2 26 21 - 32 mmol/L Anion gap 10 5 - 15 mmol/L Glucose 146 (H) 65 - 100 mg/dL BUN 32 (H) 6 - 20 MG/DL Creatinine 3.60 (H) 0.70 - 1.30 MG/DL  
 BUN/Creatinine ratio 9 (L) 12 - 20 GFR est AA 19 (L) >60 ml/min/1.73m2 GFR est non-AA 16 (L) >60 ml/min/1.73m2 Calcium 8.8 8.5 - 10.1 MG/DL Bilirubin, total 1.1 (H) 0.2 - 1.0 MG/DL  
 ALT (SGPT) 353 (H) 12 - 78 U/L  
 AST (SGOT) 544 (H) 15 - 37 U/L Alk. phosphatase 112 45 - 117 U/L Protein, total 7.2 6.4 - 8.2 g/dL Albumin 3.4 (L) 3.5 - 5.0 g/dL Globulin 3.8 2.0 - 4.0 g/dL A-G Ratio 0.9 (L) 1.1 - 2.2    
CBC WITH AUTOMATED DIFF Collection Time: 10/05/19  4:01 AM  
Result Value Ref Range WBC 18.5 (H) 4.1 - 11.1 K/uL  
 RBC 3.52 (L) 4.10 - 5.70 M/uL  
 HGB 10.9 (L) 12.1 - 17.0 g/dL HCT 34.2 (L) 36.6 - 50.3 % MCV 97.2 80.0 - 99.0 FL  
 MCH 31.0 26.0 - 34.0 PG  
 MCHC 31.9 30.0 - 36.5 g/dL  
 RDW 16.4 (H) 11.5 - 14.5 % PLATELET 822 (L) 537 - 400 K/uL MPV 9.9 8.9 - 12.9 FL  
 NRBC 0.4 (H) 0  WBC ABSOLUTE NRBC 0.07 (H) 0.00 - 0.01 K/uL NEUTROPHILS 87 (H) 32 - 75 % LYMPHOCYTES 4 (L) 12 - 49 % MONOCYTES 8 5 - 13 % EOSINOPHILS 0 0 - 7 % BASOPHILS 0 0 - 1 % IMMATURE GRANULOCYTES 1 (H) 0.0 - 0.5 % ABS. NEUTROPHILS 16.2 (H) 1.8 - 8.0 K/UL  
 ABS. LYMPHOCYTES 0.7 (L) 0.8 - 3.5 K/UL  
 ABS. MONOCYTES 1.5 (H) 0.0 - 1.0 K/UL  
 ABS. EOSINOPHILS 0.0 0.0 - 0.4 K/UL  
 ABS. BASOPHILS 0.0 0.0 - 0.1 K/UL  
 ABS. IMM. GRANS. 0.2 (H) 0.00 - 0.04 K/UL  
 DF AUTOMATED    
LACTIC ACID Collection Time: 10/05/19  4:13 AM  
Result Value Ref Range Lactic acid 4.7 (HH) 0.4 - 2.0 MMOL/L  
POC G3 - PUL Collection Time: 10/05/19  5:40 AM  
Result Value Ref Range FIO2 (POC) 30 % pH (POC) 7.418 7.35 - 7.45    
 pCO2 (POC) 33.6 (L) 35.0 - 45.0 MMHG  
 pO2 (POC) 100 80 - 100 MMHG  
 HCO3 (POC) 21.7 (L) 22 - 26 MMOL/L  
 sO2 (POC) 98 (H) 92 - 97 % Base deficit (POC) 3 mmol/L Site LEFT BRACHIAL Device: VENT Mode ASSIST CONTROL Tidal volume 450 ml Set Rate 16 bpm  
 PEEP/CPAP (POC) 6 cmH2O Allens test (POC) YES Specimen type (POC) ARTERIAL Total resp. rate 24 GLUCOSE, POC Collection Time: 10/05/19  8:22 AM  
Result Value Ref Range Glucose (POC) 130 (H) 65 - 100 mg/dL Performed by Renetta Hardy Imaging: 
I have personally reviewed the patients radiographs and have reviewed the reports: 
pulm edema  
 ett  Is [facility] [employer] seen Ccm 35 Madelyn Mohan MD

## 2019-10-05 NOTE — PROCEDURES
Assessment   Pre    
LOC  Sedated/ on vent Lungs   Wheezing in all fields Cardiac   HRR Skin   Bruised fragile skin Edema +1 generalized

## 2019-10-05 NOTE — PROCEDURES
Central Line Procedure Note Indication: Inadequate venous access Start:  2037 End:   2126 Risks, benefits, alternatives explained and patient agrees to proceed. Patient positioned in Trendelenburg. 7-Step Sterility Protocol followed. (cap, mask sterile gown, sterile gloves, large sterile sheet, hand hygiene, 2% chlorhexidine for cutaneous antisepsis) 5 mL 1% Lidocaine placed at insertion site (for attempts bilaterally). Right internal jugular cannulation attempted x 3 under ultrasound guidance utilizing the Seldinger technique. Unable to pass wire. Patient re-prepped and draped as above. Left internal jugular cannulated x 1 attempt using ultrasound guidance. Catheter secured & Biopatch applied. Sterile Tegaderm placed. CXR pending.    
Care turned over to covering Attending MD.

## 2019-10-05 NOTE — PROCEDURES
Intubation Note Called to bedside secondary to  respiratory failure. Start:  2033 End:   2035 Patient pre-oxygenated with 100% oxygen. Smooth, modified RSI with Propofol 150 mg IV. DVL x 1 using Glide Scope. 7.5 ETT taped and secured at 23 cm at the gums. + Bilateral BS, + Chest rise, + ETCO2 CXR pending.  
 
Care turned over to covering Attending MD.

## 2019-10-05 NOTE — PROGRESS NOTES
Pharmacy Automatic Renal Dosing Protocol - Antimicrobials Indication for Antimicrobials: HAP Current Regimen of Each Antimicrobial: 
Vancomycin 1000 mg IV every 48 hours (start 10/4, day 2) Zosyn 3.375 g IV q12h - started 10/4 day 2 Levaquin 750 mg IV q48h - started 10/4 day 2 Previous Antimicrobial Therapy: 
 
Vancomycin Trough: 15 - 20 mcg/mL  (AUC: 400 - 600 mg/hr/Liter/day) Date Dose & Interval Measured (mcg/mL) Extrapolated (mcg/mL) Significant Cultures:  
none Radiology / Imaging results: (X-ray, CT scan or MRI):  
10/4 xr chest port - Cardiomegaly, pleural effusions, and mild pulmonary edema. CHF is most likely. Bibasilar atelectasis more likely than pneumonia. Consider PA and lateral chest 
views when the patient can better tolerate. Paralysis, amputations, malnutrition:  
 
Labs: 
Recent Labs 10/05/19 
0401 10/04/19 
0419 10/04/19 
0009 CREA 3.60* 4.07* 3.96* BUN 32* 28* 26* WBC 18.5*  --  20.0* Temp (24hrs), Av °F (37.2 °C), Min:98.4 °F (36.9 °C), Max:99.7 °F (37.6 °C) Creatinine Clearance (mL/min) or Dialysis: HD Impression/Plan:  
· HD is being ordered daily as needed per Nephrology · Adjusted Vancomycin to 750mg IV QHD · Continue Levofloxacin and Zosyn regimens · Antimicrobial stop date - pending · Pharmacy will follow daily and adjust medications as appropriate for renal function and/or serum levels.  
 
Thank you, 
Gustavo Arechiga, [facility]

## 2019-10-05 NOTE — PROGRESS NOTES
RENAL  PROGRESS NOTE Subjective:  
on vent Objective: VITALS SIGNS:   
Visit Vitals /43 Pulse 82 Temp 99.3 °F (37.4 °C) Resp 21 Ht 5' 7\" (1.702 m) Wt 72.6 kg (160 lb 0.9 oz) SpO2 100% BMI 25.07 kg/m² O2 Device: Ventilator O2 Flow Rate (L/min): 8 l/min Temp (24hrs), Av.7 °F (37.1 °C), Min:98.4 °F (36.9 °C), Max:99.3 °F (37.4 °C) PHYSICAL EXAM: 
Vent 
nno edema DATA REVIEW:  
 
INTAKE / OUTPUT:  
Last shift:      No intake/output data recorded. Last 3 shifts: 10/03 1901 - 10/05 07 In: 740.3 [I.V.:740.3] Out: 1000 Intake/Output Summary (Last 24 hours) at 10/5/2019 6398 Last data filed at 10/5/2019 0700 Gross per 24 hour Intake 690.3 ml Output 1000 ml Net -309.7 ml  
 
 
 
LABS:  
Recent Labs 10/05/19 
0401 10/04/19 
000 WBC 18.5* 20.0* HGB 10.9* 12.1 HCT 34.2* 38.8 * 216 Recent Labs 10/05/19 
0401 10/04/19 
0419 10/04/19 
0009  129* 137  
K 5.0 4.2 4.6  96* 103 CO2 26 26 24 * 538* 308* BUN 32* 28* 26* CREA 3.60* 4.07* 3.96* CA 8.8 8.3* 8.5 ALB 3.4*  --  3.2* TBILI 1.1*  --  0.4 SGOT 544*  --  24 *  --  19 Assessment :    Plan: ESKD Pulmonary edema COPD exacerbation Hyponatremia Uncontrolled DM 
leukocytosis MWF HD at [facility]; emergent HD yesterday  ; iUF today

## 2019-10-05 NOTE — PROGRESS NOTES
TRANSFER - IN REPORT: 
 
Verbal report received from [address] [facility] on Gerson Lazaro  being received from [phone] Report consisted of patients Situation, Background, Assessment and  
Recommendations(SBAR). Information from the following report(s) Kardex was reviewed with the receiving nurse. Opportunity for questions and clarification was provided. Assessment completed upon patients arrival to unit and care assumed.

## 2019-10-05 NOTE — PROGRESS NOTES
Cardiology Progress Note 
 
 
10/5/2019 11:51 AM 
 
Admit Date: 10/3/2019 Admit Diagnosis: ARF (acute respiratory failure) ([facility] [address] [J96.00] Subjective:  
 
Gila Handy is intubated. Troponin elevated suggestive of NSTEMI. ? Type 2 secondary to stress. Decreased LVEF suggestive of high probability of CAD. Visit Vitals BP (!) 104/38 Pulse 83 Temp 99.1 °F (37.3 °C) Resp (!) 34 Ht 5' 7\" (1.702 m) Wt 160 lb 0.9 oz (72.6 kg) SpO2 100% BMI 25.07 kg/m² Current Facility-Administered Medications Medication Dose Route Frequency  propofol (DIPRIVAN) infusion  0-50 mcg/kg/min IntraVENous TITRATE  budesonide (PULMICORT) 500 mcg/2 ml nebulizer suspension  500 mcg Nebulization BID RT  
 [START ON 10/6/2019] levoFLOXacin (LEVAQUIN) 500 mg in D5W IVPB  500 mg IntraVENous Q48H  
 [START ON 10/6/2019] vancomycin (VANCOCIN) 1,000 mg in 0.9% sodium chloride (MBP/ADV) 250 mL  1,000 mg IntraVENous Q48H  piperacillin-tazobactam (ZOSYN) 3.375 g in 0.9% sodium chloride (MBP/ADV) 100 mL  3.375 g IntraVENous Q12H  
 sodium chloride (NS) flush 5-40 mL  5-40 mL IntraVENous Q8H  
 sodium chloride (NS) flush 5-40 mL  5-40 mL IntraVENous PRN  
 acetaminophen (TYLENOL) tablet 650 mg  650 mg Oral Q6H PRN  
 ondansetron (ZOFRAN) injection 4 mg  4 mg IntraVENous Q6H PRN  
 bisacodyl (DULCOLAX) tablet 5 mg  5 mg Oral DAILY PRN  
 heparin (porcine) injection 5,000 Units  5,000 Units SubCUTAneous Q8H  
 albuterol-ipratropium (DUO-NEB) 2.5 MG-0.5 MG/3 ML  3 mL Nebulization Q4H PRN  
 methylPREDNISolone (PF) (SOLU-MEDROL) injection 40 mg  40 mg IntraVENous Q8H  
 famotidine (PF) (PEPCID) 20 mg in sodium chloride 0.9% 10 mL injection  20 mg IntraVENous Q24H  
 albuterol-ipratropium (DUO-NEB) 2.5 MG-0.5 MG/3 ML  3 mL Nebulization Q4H RT  
 insulin lispro (HUMALOG) injection   SubCUTAneous AC&HS  
 glucose chewable tablet 16 g  4 Tab Oral PRN  
  dextrose (D50W) injection syrg 12.5-25 g  12.5-25 g IntraVENous PRN  
 glucagon (GLUCAGEN) injection 1 mg  1 mg IntraMUSCular PRN  
 albumin human 25% (BUMINATE) solution 12.5 g  12.5 g IntraVENous DIALYSIS PRN  
 PHENYLephrine (PF)(DARIUS-SYNEPHRINE) 30 mg in 0.9% sodium chloride 250 mL infusion   mcg/min IntraVENous TITRATE  alcohol 62% (NOZIN) nasal  1 Ampule  1 Ampule Topical Q12H Objective:  
  
Physical Exam: 
Visit Vitals BP (!) 104/38 Pulse 83 Temp 99.1 °F (37.3 °C) Resp (!) 34 Ht 5' 7\" (1.702 m) Wt 160 lb 0.9 oz (72.6 kg) SpO2 100% BMI 25.07 kg/m² General Appearance:  Well developed, well nourished,alert and oriented x 0, and individual in no acute distress. Ears/Nose/Mouth/Throat:   Hearing grossly normal. 
  
    Neck: Supple. Chest:   Lungs clear to auscultation bilaterally. Cardiovascular:  Regular rate and rhythm, S1, S2 normal, no murmur. Abdomen:   Soft, non-tender, bowel sounds are active. Extremities: No edema bilaterally. Skin: Warm and dry. Data Review:  
Labs:   
Recent Results (from the past 24 hour(s)) PROCALCITONIN Collection Time: 10/04/19 11:59 AM  
Result Value Ref Range Procalcitonin 1.3 ng/mL LACTIC ACID Collection Time: 10/04/19 11:59 AM  
Result Value Ref Range Lactic acid 3.4 (HH) 0.4 - 2.0 MMOL/L  
CK W/ CKMB & INDEX Collection Time: 10/04/19 11:59 AM  
Result Value Ref Range  39 - 308 U/L  
 CK - MB 19.4 (H) <3.6 NG/ML  
 CK-MB Index 13.7 (H) 0.0 - 2.5    
TROPONIN I Collection Time: 10/04/19 11:59 AM  
Result Value Ref Range Troponin-I, Qt. 2.38 (H) <0.05 ng/mL HEMOGLOBIN A1C WITH EAG Collection Time: 10/04/19 11:59 AM  
Result Value Ref Range Hemoglobin A1c 5.3 4.2 - 6.3 % Est. average glucose 105 mg/dL GLUCOSE, POC Collection Time: 10/04/19  4:57 PM  
Result Value Ref Range Glucose (POC) 129 (H) 65 - 100 mg/dL Performed by Rigoberto Spear TROPONIN I Collection Time: 10/04/19  5:31 PM  
Result Value Ref Range Troponin-I, Qt. 4.39 (H) <0.05 ng/mL CK W/ CKMB & INDEX Collection Time: 10/04/19  5:31 PM  
Result Value Ref Range  39 - 308 U/L  
 CK - MB 22.7 (H) <3.6 NG/ML  
 CK-MB Index 10.0 (H) 0.0 - 2.5    
TROPONIN I Collection Time: 10/04/19  5:31 PM  
Result Value Ref Range Troponin-I, Qt. 4.45 (H) <0.05 ng/mL GLUCOSE, POC Collection Time: 10/04/19 11:49 PM  
Result Value Ref Range Glucose (POC) 158 (H) 65 - 100 mg/dL Performed by Nikky Dennison. [facility] METABOLIC PANEL, COMPREHENSIVE Collection Time: 10/05/19  4:01 AM  
Result Value Ref Range Sodium 138 136 - 145 mmol/L Potassium 5.0 3.5 - 5.1 mmol/L Chloride 102 97 - 108 mmol/L  
 CO2 26 21 - 32 mmol/L Anion gap 10 5 - 15 mmol/L Glucose 146 (H) 65 - 100 mg/dL BUN 32 (H) 6 - 20 MG/DL Creatinine 3.60 (H) 0.70 - 1.30 MG/DL  
 BUN/Creatinine ratio 9 (L) 12 - 20 GFR est AA 19 (L) >60 ml/min/1.73m2 GFR est non-AA 16 (L) >60 ml/min/1.73m2 Calcium 8.8 8.5 - 10.1 MG/DL Bilirubin, total 1.1 (H) 0.2 - 1.0 MG/DL  
 ALT (SGPT) 353 (H) 12 - 78 U/L  
 AST (SGOT) 544 (H) 15 - 37 U/L Alk. phosphatase 112 45 - 117 U/L Protein, total 7.2 6.4 - 8.2 g/dL Albumin 3.4 (L) 3.5 - 5.0 g/dL Globulin 3.8 2.0 - 4.0 g/dL A-G Ratio 0.9 (L) 1.1 - 2.2    
CBC WITH AUTOMATED DIFF Collection Time: 10/05/19  4:01 AM  
Result Value Ref Range WBC 18.5 (H) 4.1 - 11.1 K/uL  
 RBC 3.52 (L) 4.10 - 5.70 M/uL  
 HGB 10.9 (L) 12.1 - 17.0 g/dL HCT 34.2 (L) 36.6 - 50.3 % MCV 97.2 80.0 - 99.0 FL  
 MCH 31.0 26.0 - 34.0 PG  
 MCHC 31.9 30.0 - 36.5 g/dL  
 RDW 16.4 (H) 11.5 - 14.5 % PLATELET 074 (L) 023 - 400 K/uL MPV 9.9 8.9 - 12.9 FL  
 NRBC 0.4 (H) 0  WBC ABSOLUTE NRBC 0.07 (H) 0.00 - 0.01 K/uL NEUTROPHILS 87 (H) 32 - 75 % LYMPHOCYTES 4 (L) 12 - 49 % MONOCYTES 8 5 - 13 % EOSINOPHILS 0 0 - 7 % BASOPHILS 0 0 - 1 % IMMATURE GRANULOCYTES 1 (H) 0.0 - 0.5 % ABS. NEUTROPHILS 16.2 (H) 1.8 - 8.0 K/UL  
 ABS. LYMPHOCYTES 0.7 (L) 0.8 - 3.5 K/UL  
 ABS. MONOCYTES 1.5 (H) 0.0 - 1.0 K/UL  
 ABS. EOSINOPHILS 0.0 0.0 - 0.4 K/UL  
 ABS. BASOPHILS 0.0 0.0 - 0.1 K/UL  
 ABS. IMM. GRANS. 0.2 (H) 0.00 - 0.04 K/UL  
 DF AUTOMATED    
LACTIC ACID Collection Time: 10/05/19  4:13 AM  
Result Value Ref Range Lactic acid 4.7 (HH) 0.4 - 2.0 MMOL/L  
POC G3 - PUL Collection Time: 10/05/19  5:40 AM  
Result Value Ref Range FIO2 (POC) 30 % pH (POC) 7.418 7.35 - 7.45    
 pCO2 (POC) 33.6 (L) 35.0 - 45.0 MMHG  
 pO2 (POC) 100 80 - 100 MMHG  
 HCO3 (POC) 21.7 (L) 22 - 26 MMOL/L  
 sO2 (POC) 98 (H) 92 - 97 % Base deficit (POC) 3 mmol/L Site LEFT BRACHIAL Device: VENT Mode ASSIST CONTROL Tidal volume 450 ml Set Rate 16 bpm  
 PEEP/CPAP (POC) 6 cmH2O Allens test (POC) YES Specimen type (POC) ARTERIAL Total resp. rate 24 GLUCOSE, POC Collection Time: 10/05/19  8:22 AM  
Result Value Ref Range Glucose (POC) 130 (H) 65 - 100 mg/dL Performed by Charito Gallegos LACTIC ACID Collection Time: 10/05/19 10:32 AM  
Result Value Ref Range Lactic acid 6.2 (HH) 0.4 - 2.0 MMOL/L  
GLUCOSE, POC Collection Time: 10/05/19 11:39 AM  
Result Value Ref Range Glucose (POC) 156 (H) 65 - 100 mg/dL Performed by [facility] Telemetry: normal sinus rhythm Assessment:  
 
Active Problems: 
  ARF (acute respiratory failure) ([facility] [address] (10/4/2019) Plan:  
 
Repeat EKG. On pressors, betablocker when off. Consider inotropic therapy if no improvement. ASA. Dialysis for volume removal. 
 
Cath next week.  
 
Fadi Nielson MD

## 2019-10-05 NOTE — PROGRESS NOTES
10/05/19 5792 ABCDEF Bundle SBT Safety Screen Passed Yes SBT Trial Passed No  
SBT Trial Reason for Failure Respiratory rate > 35;RSBI>105

## 2019-10-05 NOTE — PROGRESS NOTES
0700-Bedside report received from off-going RN Kieran to on-coming RN Indy Valladares Patient is intubated and sedated. Patient has ET tube measuring 23 @ the lips and is clean, dry, and intact. Inline suction performed and patient has blood tinged secretions. Complex assessment completed. Will continue to monitor patient. [address] Artelia Drivers from lab called in a critical lactic acid of 6.2. Paged and notified MD Vahe Snow at [phone]. Also spoke with MD Vahe Snow about low blood pressures and concern for dialysis later this afternoon. MD Vahe Snow stated to change Juan C order from 100 mcg-300 mcg. 
 
1200 Reassessment unchanged [address] Patient went into uncontrolled A-fib into the 150's that lasted around 5 minutes. Blood pressures dropped and Juan C was increased. Patient converted back to NSR with 1st degree block. EKG obtained and MD Edward Waller was verbally notified about patients episode. Please see MAR and flow-sheet for medication titrations. [address] Patient was bathed, sheets changed, and repositioned using TAPS.   
 
1509 Report given to [facility]

## 2019-10-05 NOTE — PROCEDURES
[facility] Dialysis Team [place]  [phone] Vitals   Pre   Post   Assessment   Pre   Post    
Temp  Temp: 99 °F (37.2 °C) (10/05/19 1505)  97.9 LOC  Sedated/on Vent Sedated/Vent HR   Pulse (Heart Rate): 82 (10/05/19 1505) 85 Lungs   Wheezing in all fields  Wheezing,Diminished bases B/P   BP: 114/40 (10/05/19 1505) 96/34 Cardiac   Regular Regular Resp   Resp Rate: 25 (10/05/19 1505) 26 Skin   Warm/bruised skin Warm/bruised 
skin Pain level  Pain Intensity 1: 3 (10/05/19 1144) 0 Edema  Generalized 1+ Generalized 1+ Orders: Duration:   Start:    1505 End:    1706 Total:   2 Dialyzer:   Dialyzer/Set Up Inspection: Cb Farris (10/05/19 1505) K Bath:   Dialysate K (mEq/L): 2 (10/05/19 1505) Ca Bath:   Dialysate CA (mEq/L): 2.5 (10/05/19 1505) Na/Bicarb:   Dialysate NA (mEq/L): 138 (10/05/19 1505) Target Fluid Removal:   Goal/Amount of Fluid to Remove (mL): 2000 mL (10/05/19 1505) Access Type & Location:   MICHELLE AVF cannulated with 15 G needles x 2 with no problems Prepped with Alcohol as per policy. Site dry and intact No S/S Of infection. Bruit and thrill faint on both arterial and venous Labs Obtained/Reviewed Critical Results Called   Date when labs were drawn- 
Hgb-   
HGB Date Value Ref Range Status 10/05/2019 10.9 (L) 12.1 - 17.0 g/dL Final  
 
K-   
Potassium Date Value Ref Range Status 10/05/2019 5.0 3.5 - 5.1 mmol/L Final  
 
Ca-  
Calcium Date Value Ref Range Status 10/05/2019 8.8 8.5 - 10.1 MG/DL Final  
 
Bun-  
BUN Date Value Ref Range Status 10/05/2019 32 (H) 6 - 20 MG/DL Final  
 
Creat-  
Creatinine Date Value Ref Range Status 10/05/2019 3.60 (H) 0.70 - 1.30 MG/DL Final  
 
  
Medications/ Blood Products Given Name   Dose   Route and Time None Blood Volume Processed (BVP):    0 Net Fluid Removed:  2000 ml Comments Time Out Done: 0120 Primary Nurse Rpt Pre:Mehnaz Truong RN 
 Primary Nurse Rpt Albert Gonzáles RN Pt Education:Family about Ultrafiltration Care Plan:Continue HD Plan of care Tx Summary:Arrived at Pt's room HD set up HD RN assessment done by Carlo Dominique RN. Pt on Juan C for low blood pressure. After Timeout done Ultrafiltration started 1505:UF started with no problems 1535:Pt no distress 1600: Access secured Lines Visible 1630:VSS tolerating treatment 1705: Ultrafiltration completed All possible blood returned with NS rinsed back Removed needles with minimal bleeding time A 6 mins V 6 mins Report given to Cindi Garsia RN 
 
HD Related Medications Reviewed Admiting Diagnosis:Acute Respiratory Failure Pt's previous clinic- 
Consent signed - Informed Consent Verified: Yes (10/05/19 7198) Harpreet Consent - Yes on file Hepatitis Status- Hep B Antigen Negative 10/02/19 Machine #- Machine Number: [id number]/ [id number] (10/05/19 1504) Telemetry status- 
Pre-dialysis wt. - Pre-Dialysis Weight: 78 kg (171 lb 15.3 oz) (10/04/19 1420)

## 2019-10-06 NOTE — PROGRESS NOTES
Death Note Physical Exam: No corneal reflex. No heart sounds on auscultation. No spontaneous breath sounds. No withdrawal from painful stimuli. Time of Death 5.00 Am

## 2019-10-06 NOTE — PROGRESS NOTES
RT Note: due to change in Pt's health status, Pt's daughter Marlena Dunham) wishes for comfort care at this time. Hospitalist to meet with Daughter in AM. Daughter wishes to hold all resp therapy (neb tx's) as well as resp blood work (Abg's) at this time. RN aware of change.

## 2019-10-06 NOTE — PROGRESS NOTES
Pt's daughter Da Avila) wishes for comfort care at this time ,and change code status to DNR ,code status changed to DNR , start comfort measure as per daughter request

## 2019-10-06 NOTE — PROGRESS NOTES
Lactic Acidosis, ordered 250 bolus N.S Lactic acid level 7.7 due to septic shock. Continue current treatment plan. Limit fluids due to HD.

## 2019-10-06 NOTE — PROGRESS NOTES
1900- Report received. Patient sedated, on vent. No acute distress noted. Juan C at 150mcg, 02 sat 99% 
-- Sat dropping, fingers becoming more purple. Changed probe to ear. 02 increased. 0140-BP continuing to require more pressor support. Currently 200mcg. Propofol at 10mcg. No response to voice or pain. Propofol stopped. 0200- Daughter called. Updated on status. She states he is a DNR and she has a copy at home and for us to not code her father. 0230- Daughter present advance directive in hand. She says now I just wait. In discussion of treatment she did not want to do anything to prolong things. I discussed this with her. Hospitalist notified. He plans to come and speak with daughter. 0400-Patient to be comfort care per family and patient wishes. Dr. Sheila Ham and orders received. 46- Extubated. 0500- Time of death. Asystole strip printed. Daughter in waiting room. Notified. 0530-Physician paged to pronounce. 0630- Pronounced, house super,MD and life net notified. Paged . Patient prepared for the morgue.

## 2019-10-06 NOTE — PROGRESS NOTES
Hospitalist Progress Note NAME: Kavitha Yuen :  1929 MRN:  [id number] Assessment / Plan: 
Acute hypercapnic respiratory failure most likely secondary to pneumonia on superimposed COPD exacerbation and fluid overload now intubated due to sob. Cont vent support. Cont propofol for sedation 
-cont Levaquin, zosyn and vancomycin. Cont solumedrol. Cont jet nebs 
-pulmonary following 
-daily weaning trial 
 
Septic shock 
-cont neosynephrine. Caution with fluid removal during HD. Cont levaquin  
  
ESRD  
-Nephrology following. Cont HD for fluid removal 
 
Mild troponin elevation likely due to type 2 nstemi and esrd 
-cards following 
-cont asa Coronary artery disease Continue home medication when list available History of bladder cancer 
  
  
Code Status: Full Surrogate Decision Maker:SERJIO WILSON 
  
DVT Prophylaxis: Heparin GI Prophylaxis: not indicated Body mass index is 25.07 kg/m². Subjective: Chief Complaint / Reason for Physician Visit He is now intubated and on vasopressors Review of Systems: 
Symptom Y/N Comments  Symptom Y/N Comments Fever/Chills    Chest Pain Poor Appetite    Edema Cough    Abdominal Pain Sputum    Joint Pain SOB/GANNON    Pruritis/Rash Nausea/vomit    Tolerating PT/OT Diarrhea    Tolerating Diet Constipation    Other Could NOT obtain due to:   
 
Objective: VITALS:  
Last 24hrs VS reviewed since prior progress note. Most recent are: 
Patient Vitals for the past 24 hrs: 
 Temp Pulse Resp BP SpO2  
10/05/19 2100  92 25 120/43 97 % 10/05/19 2000 98.5 °F (36.9 °C) 86 18 (!) 106/34 98 % 10/05/19 1936     100 % 10/05/19 1900  83 25 (!) 99/36 100 % 10/05/19 1847  83 24  100 % 10/05/19 1800  85 25 (!) 96/34 100 % 10/05/19 1719  86 25 (!) 88/31 98 % 10/05/19 1714  85 24 (!) 87/31 100 % 10/05/19 1713  76 22  100 % 10/05/19 1712  75 25  99 % 10/05/19 1711  74 25  99 % 10/05/19 1710  74 26  98 % 10/05/19 1709  74 25  99 % 10/05/19 1708  74 25  98 % 10/05/19 1707  74 26  99 % 10/05/19 1706 97.9 °F (36.6 °C) 74 24 (!) 96/34 98 % 10/05/19 1705  75 26  99 % 10/05/19 1704  75 25  99 % 10/05/19 1703  74 25  99 % 10/05/19 1702  74 26  99 % 10/05/19 1701  74 25  99 % 10/05/19 1700  74 25 (!) 96/27 99 % 10/05/19 1659  74 25  99 % 10/05/19 1658  75 26  99 % 10/05/19 1657  74 24  98 % 10/05/19 1656  74 24  98 % 10/05/19 1655  75 24  99 % 10/05/19 1654  75 25  99 % 10/05/19 1653  74 25  99 % 10/05/19 1652  74 25  98 % 10/05/19 1651  74 25  98 % 10/05/19 1650  75 25  98 % 10/05/19 1649  74 23  98 % 10/05/19 1648  74 25  98 % 10/05/19 1647  75 24  99 % 10/05/19 1646  75 24  99 % 10/05/19 1645  75 23 (!) 98/32 98 % 10/05/19 1630  75 25 (!) 100/31 98 % 10/05/19 1615  76 22 (!) 102/29 99 % 10/05/19 1601  79 20  100 % 10/05/19 1600  80 20 (!) 106/30 99 % 10/05/19 1545  80 22 (!) 104/34 98 % 10/05/19 1530  80 25 (!) 108/35 99 % 10/05/19 1515  81 22 (!) 103/33 98 % 10/05/19 1505 99 °F (37.2 °C) 82 25 114/40 98 % 10/05/19 1412    (!) 97/33   
10/05/19 1400  84 18 (!) 89/29 93 % 10/05/19 1320    (!) 86/40   
10/05/19 1316  (!) 141 27  100 % 10/05/19 1315  81 23 (!) 88/58 100 % 10/05/19 1300  83 30 105/45 100 % 10/05/19 1230  84 26 108/44 100 % 10/05/19 1215  84 (!) 32 106/44 100 % 10/05/19 1200  82 (!) 32 105/44 100 % 10/05/19 1144 99.7 °F (37.6 °C) 83 (!) 34  100 % 10/05/19 1143     100 % 10/05/19 1100  86 22 (!) 104/38 98 % 10/05/19 1000  87 23 103/42 96 % 10/05/19 0900  89 23 (!) 98/35 99 % 10/05/19 0830  90 (!) 38 109/48 100 % 10/05/19 0815 99.1 °F (37.3 °C)      
10/05/19 0800  84 24 100/44 100 % 10/05/19 0758  82 21  100 % 10/05/19 0757     100 % 10/05/19 0600  85 22 105/43 99 % 10/05/19 0500  85 28 102/40 100 % 10/05/19 0400 99.3 °F (37.4 °C) 89 27 104/44 100 % 10/05/19 0331  90 20  100 % 10/05/19 0304     100 % 10/05/19 0300  81 22 103/42 100 % 10/05/19 0200  81 22 102/44 100 % 10/05/19 0100  81 23 105/43 100 % 10/05/19 0000 99 °F (37.2 °C) 82 26 105/47 100 % 10/04/19 2325  82 17  100 % 10/04/19 2302     100 % 10/04/19 2200  82 15 98/41 100 % Intake/Output Summary (Last 24 hours) at 10/5/2019 2130 Last data filed at 10/5/2019 1706 Gross per 24 hour Intake 1209.13 ml Output 2000 ml Net -790.87 ml PHYSICAL EXAM: 
General: no acute distress, on vent EENT:  Anicteric sclerae. MMM Resp:  Air entry diminished, wheezing  +, no crackles + CV:  Regular  rhythm,  No edema GI:  Soft, Non distended, Non tender.  +Bowel sounds Neurologic:  Intubated and sedated Psych:   Not anxious nor agitated Skin:  No rashes. No jaundice Reviewed most current lab test results and cultures  YES Reviewed most current radiology test results   YES Review and summation of old records today    NO Reviewed patient's current orders and MAR    YES 
PMH/SH reviewed - no change compared to H&P Current Facility-Administered Medications:  
  propofol (DIPRIVAN) infusion, 0-50 mcg/kg/min, IntraVENous, TITRATE, Chanel Looney MD, Last Rate: 11.7 mL/hr at 10/05/19 1725, 25 mcg/kg/min at 10/05/19 1725   budesonide (PULMICORT) 500 mcg/2 ml nebulizer suspension, 500 mcg, Nebulization, BID RT, Kat Dhaliwal MD, 500 mcg at 10/05/19 1936   aspirin (ASA) suppository 300 mg, 300 mg, Rectal, DAILY, Danielle Andrade MD, 300 mg at 10/05/19 1700 
  PHENYLephrine (PF)(DARIUS-SYNEPHRINE) 30 mg in 0.9% sodium chloride 250 mL infusion,  mcg/min, IntraVENous, TITRATE, Kat Dhaliwal MD, Last Rate: 75 mL/hr at 10/05/19 2100, 150 mcg/min at 10/05/19 2100   vancomycin (VANCOCIN) 750 mg in 0.9% sodium chloride (MBP/ADV) 250 mL, 750 mg, IntraVENous, PRN, Jena Florence MD 
  Vancomycin Redosing after each HD [facility] Reminder Note, 1 Each, Other, Hank Dave MD, 1 Each at 10/05/19 1600   chlorhexidine (ORAL CARE KIT) 0.12 % mouthwash 15 mL, 15 mL, Oral, Q12H, Scot Clarke MD, 15 mL at 10/05/19 2100   [START ON 10/6/2019] levoFLOXacin (LEVAQUIN) 500 mg in D5W IVPB, 500 mg, IntraVENous, Q48H, Jennifer Soto MD 
  [COMPLETED] piperacillin-tazobactam (ZOSYN) 3.375 g in 0.9% sodium chloride (MBP/ADV) 100 mL, 3.375 g, IntraVENous, ONCE, Last Rate: 200 mL/hr at 10/04/19 0300, 3.375 g at 10/04/19 0300 **FOLLOWED BY** piperacillin-tazobactam (ZOSYN) 3.375 g in 0.9% sodium chloride (MBP/ADV) 100 mL, 3.375 g, IntraVENous, Q12H, Vadim Randolph MD, Last Rate: 25 mL/hr at 10/05/19 2039, 3.375 g at 10/05/19 2039   sodium chloride (NS) flush 5-40 mL, 5-40 mL, IntraVENous, Q8H, Terri Acosta MD, 10 mL at 10/05/19 1347 
  sodium chloride (NS) flush 5-40 mL, 5-40 mL, IntraVENous, PRN, Niecy Lewis MD 
  acetaminophen (TYLENOL) tablet 650 mg, 650 mg, Oral, Q6H PRN, Niecy Lewis MD 
  ondansetron [facility]) injection 4 mg, 4 mg, IntraVENous, Q6H PRN, Niecy Lewis MD 
  bisacodyl (DULCOLAX) tablet 5 mg, 5 mg, Oral, DAILY PRN, Niecy Lewis MD 
  heparin (porcine) injection 5,000 Units, 5,000 Units, SubCUTAneous, Q8H, Niecy Lewis MD, 5,000 Units at 10/05/19 2040   albuterol-ipratropium (DUO-NEB) 2.5 MG-0.5 MG/3 ML, 3 mL, Nebulization, Q4H PRN, Niecy Lewis MD, 3 mL at 10/04/19 0901   methylPREDNISolone (PF) (SOLU-MEDROL) injection 40 mg, 40 mg, IntraVENous, Q8H, Niecy Lewis MD, 40 mg at 10/05/19 1343   famotidine (PF) (PEPCID) 20 mg in sodium chloride 0.9% 10 mL injection, 20 mg, IntraVENous, Q24H, Niecy Lewis MD, 20 mg at 10/05/19 2501   albuterol-ipratropium (DUO-NEB) 2.5 MG-0.5 MG/3 ML, 3 mL, Nebulization, Q4H RT, Natalia Cox MD, 3 mL at 10/05/19 1936 
  insulin lispro (HUMALOG) injection, , SubCUTAneous, AC&HS, Natalia Cox MD, Stopped at 10/04/19 1130 
  glucose chewable tablet 16 g, 4 Tab, Oral, PRN, Jordan Acosta MD 
  dextrose (D50W) injection syrg 12.5-25 g, 12.5-25 g, IntraVENous, PRN, Jordan Acosta MD 
  glucagon (GLUCAGEN) injection 1 mg, 1 mg, IntraMUSCular, PRN, Jordan Acosta MD 
  albumin human 25% (BUMINATE) solution 12.5 g, 12.5 g, IntraVENous, DIALYSIS PRN, Marlyn Smith MD, 12.5 g at 10/04/19 1620 
  alcohol 62% (NOZIN) nasal  1 Ampule, 1 Ampule, Topical, Q12H, Jordan Acosta MD, 1 Ampule at 10/05/19 2043 
________________________________________________________________________ Care Plan discussed with: 
  Comments Patient Family RN y   
Care Manager Consultant Multidiciplinary team rounds were held today with , nursing, pharmacist and clinical coordinator. Patient's plan of care was discussed; medications were reviewed and discharge planning was addressed. ________________________________________________________________________ Total NON critical care TIME:  35   Minutes Total CRITICAL CARE TIME Spent:   Minutes non procedure based Comments >50% of visit spent in counseling and coordination of care    
________________________________________________________________________ Da Segal MD  
 
Procedures: see electronic medical records for all procedures/Xrays and details which were not copied into this note but were reviewed prior to creation of Plan. LABS: 
I reviewed today's most current labs and imaging studies. Pertinent labs include: 
Recent Labs 10/05/19 
0401 10/04/19 
0009 WBC 18.5* 20.0* HGB 10.9* 12.1 HCT 34.2* 38.8 * 216 Recent Labs 10/05/19 
0401 10/04/19 
0419 10/04/19 
0009  129* 137  
K 5.0 4.2 4.6  96* 103 CO2 26 26 24 * 538* 308* BUN 32* 28* 26* CREA 3.60* 4.07* 3.96* CA 8.8 8.3* 8.5 ALB 3.4*  --  3.2* TBILI 1.1*  --  0.4 SGOT 544*  --  24 *  --  19 Signed: Rehan Penningtons, MD

## 2019-10-07 NOTE — ROUTINE PROCESS
Quality: Chart reviewed for death and restraints. Restraints (bilateral soft wrist) noted 10/4/19 - 10/5/19. Restraints not in place at time of death and were not a contributing factor in patient death. Documented for tracking according to CMS guidelines at 11:29 on 10/7/19.

## 2024-02-28 NOTE — DISCHARGE SUMMARY
Hospitalist Discharge Summary Patient ID: 
Angelika Hillman 
[id number] 
43 y.o. 
1929 
10/3/2019 PCP on record: Ki Bah MD 
 
Admit date: 10/3/2019 Discharge date and time: 10/6/2019 DISCHARGE DIAGNOSIS: 
 
Pt  during hospitalization Acute hypercapnic respiratory failure most likely secondary to pneumonia on superimposed COPD exacerbation and fluid overload Septic shock ESRD Mild troponin elevation likely due to type 2 nstemi and esrd Coronary artery disease History of bladder cancer 
  
 
CONSULTATIONS: 
IP CONSULT TO PULMONOLOGY 
IP CONSULT TO NEPHROLOGY 
IP CONSULT TO CARDIOLOGY Excerpted HPI from H&P of Donnie Reddy MD: 
80years old male from home with past medical history significant for COPD, end-stage renal disease, coronary artery disease, bladder cancer presented to the hospital for worsening shortness of breath started  several days ago blood work in ED was significant for elevated white blood cell count 20.0 chest x-ray was done and show pleural effusion and mild pulmonary edema, ABG was done and show elevated PCO2 68, patient was placed on BiPAP.  
  
  
  
  
 
 
______________________________________________________________________ DISCHARGE SUMMARY/HOSPITAL COURSE:  for full details see H&P, daily progress notes, labs, consult notes.   
 
 
Patient was admitted to the hospital and was diagnosed to have acute hypercapnic respiratory failure secondary to pneumonia along with superimposed COPD exacerbation and also may be fluid overload.  Patient was intubated and continued on mechanical ventilation and propofol was started for sedation.  He was continued on vancomycin, Zosyn and also levofloxacin.  Was also started on Solu-Medrol along with jet nebs.  Pulmonary was following the patient for acute respiratory failure.  Patient was also noted to have septic shock most likely due to pneumonia Pt requesting 2 pm slot for physical. Dr. Colon has no day that she offers this time for physicals. Is Dr. Colon okay with breaking up her template to fit pt in     Advise    for which he was started on Juan C-Synephrine for blood pressure support.  A consultation was placed with nephrology service for hemodialysis needs. Carolina Bolton was also noted to have mild troponin elevation likely due to type II non-STEMI and end-stage renal disease.  While treatment was going on, family visited the patient and informed on-call MD that patient was a DNR and due to overall poor prognosis they were planning on transitioning the patient to comfort care and patient was transitioned to comfort care and subsequently after that patient  on 10/6/2019 at 5 AM. 
 
Cause of death: 
Septic shock End-stage renal disease on hemodialysis Acute hypercapnic respiratory failure due to fluid overload, COPD 
 
 
_______________________________________________________________________ Signed: 
David Siu MD